# Patient Record
Sex: MALE | Race: WHITE | NOT HISPANIC OR LATINO | Employment: OTHER | URBAN - METROPOLITAN AREA
[De-identification: names, ages, dates, MRNs, and addresses within clinical notes are randomized per-mention and may not be internally consistent; named-entity substitution may affect disease eponyms.]

---

## 2023-10-06 NOTE — PROGRESS NOTES
PT Evaluation     Today's date: 10/9/2023  Patient name: Tamela Parker  : 1940  MRN: [id number]  Referring provider: Monroe Nelson MD  Dx:   Encounter Diagnosis     ICD-10-CM    1. Primary osteoarthritis of right knee  M17.11       2. Status post total right knee replacement  Z96.651           Start Time: 1015  Stop Time: 1100  Total time in clinic (min): 45 minutes    Assessment  Assessment details: Pt is a 82 y.o. male presenting to physical therapy with s/p R TKA on chief complaint of . Pt presents with decreased ROM of R knee and decreased strength of RLE that is resulting in decreased functional mobility. Pt currently limited ambulation, stairs, recreation, and household activities. PT POC will focus on improving ROM and improving quadriceps strength in order to improve function. Pt is a good candidate for skilled PT to address impairments and facilitate return to prior level of function. Impairments: abnormal gait, abnormal or restricted ROM, activity intolerance, impaired balance, impaired physical strength, lacks appropriate home exercise program and pain with function  Functional limitations: ambulation, stairs, recreation, and household activities  Symptom irritability: low  Goals  STG 4 - weeks  1. Patient will demonstrate improved R knee strength to 4+/5 in all planes to facilitate functional ability. 2. Patient will demonstrate improved R knee flexion AROM to 110 degrees to facilitate functional ability. LTG 8 - weeks  1. Patient will demonstrate improved R knee extensor lag to 0 degrees to facilitate functional ability. 2. Patient will demonstrate increased FOTO score by 10 points or more from baseline.     Plan  Patient would benefit from: PT eval and skilled physical therapy  Planned modality interventions: cryotherapy, thermotherapy: hydrocollator packs and unattended electrical stimulation  Planned therapy interventions: ADL retraining, flexibility, functional ROM exercises, home exercise program, joint mobilization, manual therapy, massage, patient education, strengthening, stretching, therapeutic activities, therapeutic exercise, neuromuscular re-education, gait training and balance/weight bearing training  Frequency: 2x week  Duration in weeks: 8  Plan of Care beginning date: 10/9/2023  Plan of Care expiration date: 2023  Treatment plan discussed with: patient        Subjective Evaluation    History of Present Illness  Mechanism of injury: Pt presents s/p R knee replacement on . Order to ambulate with assistive device for 3 weeks after surgery. Pt used walker for about the first week, now carries cane with him for support. Pt reports he had home therapy for 5 sessions for 2 weeks and was pushed adequately. Pt reports he has been improving every day. Pt feels he has plateaued slightly with the stuff at home and is ready for outpatient PT. Pt still has some swelling in R knee and lower R leg, did get swelling in past with BP medication and used compression stockings. Pt reports his pain is mostly a stretching pain now instead of a bony pain. Pt able to do stairs at home and is starting to go up reciprocally with assistance from UE. Pt descending stairs step-to pattern. Pt reports his L knee is bothering him more now. Pt reports mostly just stiffness, especially in the morning, does have slight discomfort with extending the R knee. Pt takes tylenol as needed, usually each morning.   Patient Goals  Patient goals for therapy: decreased pain, decreased edema, increased motion and increased strength  Patient goal: Walking freely and be less conscious of knee; improve stairs  Pain  Current pain ratin  At best pain ratin  At worst pain rating: 3  Location: R knee  Quality: dull ache and discomfort  Relieving factors: medications  Aggravating factors: stair climbing and walking  Progression: improved          Objective     Active Range of Motion   Left Knee Flexion: 119 degrees   Extension: -2 degrees     Right Knee   Flexion: 104 degrees   Extension: -2 degrees   Extensor la degrees     Passive Range of Motion   Left Knee   Flexion: 119 degrees     Right Knee   Flexion: 109 degrees     Strength/Myotome Testing     Left Hip   Planes of Motion   Flexion: 5  Extension: 5  Abduction: 5    Right Hip   Planes of Motion   Flexion: 4  Extension: 4  Abduction: 4    Left Knee   Flexion: 5  Extension: 5    Right Knee   Flexion: 4  Extension: 4    Additional Strength Details  Slight discomfort with extension    Ambulation     Ambulation: Stairs   Ascend stairs: independent  Pattern: reciprocal  Descend stairs: independent  Pattern: reciprocal (With pain/antalgic pattern)    Observational Gait   Gait: antalgic   Decreased walking speed and stride length.               Precautions: R TKA     POC expires MC 30 Days PT/OT + Visit Limit?    BOMN                    Manuals 10/9                                                                Neuro Re-Ed             Pt education about POC and HEP 10'            SLR 1x10            Quad sets 1x5 5"                                                                Ther Ex             Bike for ROM                                                                                                        Ther Activity             Fwd step downs 2x10 2"            Stairs negotiation 2x            Gait Training                                       Modalities

## 2023-10-09 ENCOUNTER — EVALUATION (OUTPATIENT)
Dept: PHYSICAL THERAPY | Facility: CLINIC | Age: 83
End: 2023-10-09
Payer: MEDICARE

## 2023-10-09 DIAGNOSIS — Z96.651 STATUS POST TOTAL RIGHT KNEE REPLACEMENT: ICD-10-CM

## 2023-10-09 DIAGNOSIS — M17.11 PRIMARY OSTEOARTHRITIS OF RIGHT KNEE: Primary | ICD-10-CM

## 2023-10-09 PROCEDURE — 97112 NEUROMUSCULAR REEDUCATION: CPT

## 2023-10-09 PROCEDURE — 97530 THERAPEUTIC ACTIVITIES: CPT

## 2023-10-09 PROCEDURE — 97161 PT EVAL LOW COMPLEX 20 MIN: CPT

## 2023-10-09 NOTE — LETTER
2023    Mariaelena Michael MD   [address] [address] [address]    Patient: Tasia Erickson   YOB: 1940   Date of Visit: 10/9/2023     Encounter Diagnosis     ICD-10-CM    1. Primary osteoarthritis of right knee  M17.11       2. Status post total right knee replacement  Z96.651           Dear Dr. Gayatri Giles:    Thank you for your recent referral of Tasia Erickson. Please review the attached evaluation summary from Rian's recent visit. Please verify that you agree with the plan of care by signing the attached order. If you have any questions or concerns, please do not hesitate to call. I sincerely appreciate the opportunity to share in the care of one of your patients and hope to have another opportunity to work with you in the near future. Sincerely,    Joel Stiles, PT      Referring Provider:      I certify that I have read the below Plan of Care and certify the need for these services furnished under this plan of treatment while under my care. Mariaelena Michael MD   [address] [address] [address]  Via Fax: [phone]          PT Evaluation     Today's date: 10/9/2023  Patient name: Tasia Erickson  : 1940  MRN: [id number]  Referring provider: Mariaelena Michael MD  Dx:   Encounter Diagnosis     ICD-10-CM    1. Primary osteoarthritis of right knee  M17.11       2. Status post total right knee replacement  Z96.651           Start Time: 1015  Stop Time: 1100  Total time in clinic (min): 45 minutes    Assessment  Assessment details: Pt is a 82 y.o. male presenting to physical therapy with s/p R TKA on chief complaint of . Pt presents with decreased ROM of R knee and decreased strength of RLE that is resulting in decreased functional mobility. Pt currently limited ambulation, stairs, recreation, and household activities. PT POC will focus on improving ROM and improving quadriceps strength in order to improve function.  Pt is a good candidate for skilled PT to address impairments and facilitate return to prior level of function. Impairments: abnormal gait, abnormal or restricted ROM, activity intolerance, impaired balance, impaired physical strength, lacks appropriate home exercise program and pain with function  Functional limitations: ambulation, stairs, recreation, and household activities  Symptom irritability: low  Goals  STG 4 - weeks  1. Patient will demonstrate improved R knee strength to 4+/5 in all planes to facilitate functional ability. 2. Patient will demonstrate improved R knee flexion AROM to 110 degrees to facilitate functional ability. LTG 8 - weeks  1. Patient will demonstrate improved R knee extensor lag to 0 degrees to facilitate functional ability. 2. Patient will demonstrate increased FOTO score by 10 points or more from baseline. Plan  Patient would benefit from: PT eval and skilled physical therapy  Planned modality interventions: cryotherapy, thermotherapy: hydrocollator packs and unattended electrical stimulation  Planned therapy interventions: ADL retraining, flexibility, functional ROM exercises, home exercise program, joint mobilization, manual therapy, massage, patient education, strengthening, stretching, therapeutic activities, therapeutic exercise, neuromuscular re-education, gait training and balance/weight bearing training  Frequency: 2x week  Duration in weeks: 8  Plan of Care beginning date: 10/9/2023  Plan of Care expiration date: 12/4/2023  Treatment plan discussed with: patient        Subjective Evaluation    History of Present Illness  Mechanism of injury: Pt presents s/p R knee replacement on September 9th. Order to ambulate with assistive device for 3 weeks after surgery. Pt used walker for about the first week, now carries cane with him for support. Pt reports he had home therapy for 5 sessions for 2 weeks and was pushed adequately. Pt reports he has been improving every day.  Pt feels he has plateaued slightly with the stuff at home and is ready for outpatient PT. Pt still has some swelling in R knee and lower R leg, did get swelling in past with BP medication and used compression stockings. Pt reports his pain is mostly a stretching pain now instead of a bony pain. Pt able to do stairs at home and is starting to go up reciprocally with assistance from UE. Pt descending stairs step-to pattern. Pt reports his L knee is bothering him more now. Pt reports mostly just stiffness, especially in the morning, does have slight discomfort with extending the R knee. Pt takes tylenol as needed, usually each morning. Patient Goals  Patient goals for therapy: decreased pain, decreased edema, increased motion and increased strength  Patient goal: Walking freely and be less conscious of knee; improve stairs  Pain  Current pain ratin  At best pain ratin  At worst pain rating: 3  Location: R knee  Quality: dull ache and discomfort  Relieving factors: medications  Aggravating factors: stair climbing and walking  Progression: improved          Objective     Active Range of Motion   Left Knee   Flexion: 119 degrees   Extension: -2 degrees     Right Knee   Flexion: 104 degrees   Extension: -2 degrees   Extensor la degrees     Passive Range of Motion   Left Knee   Flexion: 119 degrees     Right Knee   Flexion: 109 degrees     Strength/Myotome Testing     Left Hip   Planes of Motion   Flexion: 5  Extension: 5  Abduction: 5    Right Hip   Planes of Motion   Flexion: 4  Extension: 4  Abduction: 4    Left Knee   Flexion: 5  Extension: 5    Right Knee   Flexion: 4  Extension: 4    Additional Strength Details  Slight discomfort with extension    Ambulation     Ambulation: Stairs   Ascend stairs: independent  Pattern: reciprocal  Descend stairs: independent  Pattern: reciprocal (With pain/antalgic pattern)    Observational Gait   Gait: antalgic   Decreased walking speed and stride length.              Precautions: R TKA 9/9    POC expires MC 30 Days PT/OT + Visit Limit?   12/4 11/9 BOMN                    Manuals 10/9                                                                Neuro Re-Ed             Pt education about POC and HEP 10'            SLR 1x10            Quad sets 1x5 5"                                                                Ther Ex             Bike for ROM                                                                                                        Ther Activity             Fwd step downs 2x10 2"            Stairs negotiation 2x            Gait Training                                       Modalities

## 2023-10-12 NOTE — PROGRESS NOTES
Daily Note     Today's date: 10/13/2023  Patient name: Connor Chang  : 1940  MRN: [id number]  Referring provider: Marolyn Spurling, MD  Dx:   Encounter Diagnosis     ICD-10-CM    1. Primary osteoarthritis of right knee  M17.11       2. Status post total right knee replacement  Z96.651           Start Time: 08  Stop Time: 930  Total time in clinic (min): 46 minutes    Subjective: Pt reports he may have overdone it slightly and felt some discomfort in the back of his R leg. Pt also used compression stocking which worked for his edema but pushed the edema proximally and it pooled there. Objective: See treatment diary below      Assessment: Tolerated treatment well. Patient demonstrated fatigue post treatment, exhibited good technique with therapeutic exercises, and would benefit from continued PT. Advised on sciatic nerve glides and performed hamstring stretching for pt's posterior RLE sx and pt responded well to this, improved motion after performance. Pt demonstrates significant fatigue at end of session, advised on cryotherapy to minimize soreness. Plan: Continue per plan of care. Progress treatment as tolerated.        Precautions: R TKA     POC expires MC 30 Days PT/OT + Visit Limit?    BOMN                CHANGE POC to 3x/week at re-evaluation    Manuals 10/9 10/13 FOTO          PROM R knee flex  4'           Patella mobs R knee  NV           Hamstring stretching  5x20"                        Neuro Re-Ed             Pt education  10' 5'           SLR 1x10 2x10           Quad sets 1x5 5" 2x10 5"           Bridges  2x10                                                  Ther Ex             Upright bike for ROM  6'           SL leg press  X15 35#           Minisquats  2x10           Knee flex stretch on step  10x10"           Sciatic nerve glides  2x10                                                  Ther Activity             Fwd step downs 2x10 2" 3x10 2"           Lat step downs Stairs negotiation 2x            Gait Training                                       Modalities

## 2023-10-13 ENCOUNTER — OFFICE VISIT (OUTPATIENT)
Dept: PHYSICAL THERAPY | Facility: CLINIC | Age: 83
End: 2023-10-13
Payer: MEDICARE

## 2023-10-13 DIAGNOSIS — M17.11 PRIMARY OSTEOARTHRITIS OF RIGHT KNEE: Primary | ICD-10-CM

## 2023-10-13 DIAGNOSIS — Z96.651 STATUS POST TOTAL RIGHT KNEE REPLACEMENT: ICD-10-CM

## 2023-10-13 PROCEDURE — 97110 THERAPEUTIC EXERCISES: CPT

## 2023-10-13 PROCEDURE — 97112 NEUROMUSCULAR REEDUCATION: CPT

## 2023-10-16 ENCOUNTER — OFFICE VISIT (OUTPATIENT)
Dept: PHYSICAL THERAPY | Facility: CLINIC | Age: 83
End: 2023-10-16
Payer: MEDICARE

## 2023-10-16 DIAGNOSIS — M17.11 PRIMARY OSTEOARTHRITIS OF RIGHT KNEE: Primary | ICD-10-CM

## 2023-10-16 DIAGNOSIS — Z96.651 STATUS POST TOTAL RIGHT KNEE REPLACEMENT: ICD-10-CM

## 2023-10-16 PROCEDURE — 97140 MANUAL THERAPY 1/> REGIONS: CPT

## 2023-10-16 PROCEDURE — 97112 NEUROMUSCULAR REEDUCATION: CPT

## 2023-10-16 PROCEDURE — 97110 THERAPEUTIC EXERCISES: CPT

## 2023-10-16 NOTE — PROGRESS NOTES
Daily Note     Today's date: 10/16/2023  Patient name: Tamela Parker  : 1940  MRN: [id number]  Referring provider: Monroe Nelson MD  Dx:   Encounter Diagnosis     ICD-10-CM    1. Primary osteoarthritis of right knee  M17.11       2. Status post total right knee replacement  Z96.651           Start Time: 0800  Stop Time: 0845  Total time in clinic (min): 45 minutes    Subjective: Pt reports he feels stiff this morning. Pt feels he has been aggressive with home exercises and has been feeling some soreness along the outside of his thigh. Objective: See treatment diary below      Assessment: Tolerated treatment well. Patient demonstrated fatigue post treatment, exhibited good technique with therapeutic exercises, and would benefit from continued PT. Pt able to progress stepping and initiate side step downs from higher box, as well as increase resistance for leg press. Good tolerance to exercises this session. Plan: Continue per plan of care. Progress treatment as tolerated.        Precautions: R TKA     POC expires MC 30 Days PT/OT + Visit Limit?    BOMN                CHANGE POC to 3x/week at re-evaluation    Manuals 10/9 10/13 10/16 FOTO IE          PROM R knee flex  4' 4'          R knee extension stretch   4x15"          Patella mobs R knee  NV 1'          Hamstring stretching  5x20" 6x20"                       Neuro Re-Ed             Pt education  10' 5'           SLR 1x10 2x10 2x12          Quad sets 1x5 5" 2x10 5" 2x10 5"          Bridges  2x10 2x10                                                 Ther Ex             Upright bike for ROM  6' 9'          SL leg press  X15 35# X20 45#          Minisquats  2x10 2x10          Knee flex stretch on step  10x10" 10x10"          Sciatic nerve glides  2x10                                                  Ther Activity             Fwd step downs 2x10 2" 3x10 2" 1x10 2" 4"         Lat step downs   1x10 4"          Stairs negotiation 2x Gait Training                                       Modalities

## 2023-10-17 ENCOUNTER — OFFICE VISIT (OUTPATIENT)
Dept: PHYSICAL THERAPY | Facility: CLINIC | Age: 83
End: 2023-10-17
Payer: MEDICARE

## 2023-10-17 DIAGNOSIS — Z96.651 STATUS POST TOTAL RIGHT KNEE REPLACEMENT: ICD-10-CM

## 2023-10-17 DIAGNOSIS — M17.11 PRIMARY OSTEOARTHRITIS OF RIGHT KNEE: Primary | ICD-10-CM

## 2023-10-17 PROCEDURE — 97112 NEUROMUSCULAR REEDUCATION: CPT

## 2023-10-17 PROCEDURE — 97110 THERAPEUTIC EXERCISES: CPT

## 2023-10-17 NOTE — PROGRESS NOTES
Daily Note     Today's date: 10/17/2023  Patient name: Ricardo De Souza  : 1940  MRN: [id number]  Referring provider: Perry Engel MD  Dx:   Encounter Diagnosis     ICD-10-CM    1. Primary osteoarthritis of right knee  M17.11       2. Status post total right knee replacement  Z96.651           Start Time: 0800  Stop Time: 0846  Total time in clinic (min): 46 minutes    Subjective: Pt reports feeling his usual morning stiffness today. Objective: See treatment diary below      Assessment: Tolerated treatment well. Patient demonstrated fatigue post treatment, exhibited good technique with therapeutic exercises, and would benefit from continued PT. Pt able to progress with addition of sit to stands but challenged by this. Discussed incorporating this to a higher surface at home, as well as incorporating additional hamstring stretching due to pt noting his chief limitation of tightness here. Plan: Continue per plan of care. Progress treatment as tolerated.        Precautions: R TKA     POC expires MC 30 Days PT/OT + Visit Limit?    BOMN                CHANGE POC to 3x/week at re-evaluation    Manuals 10/9 10/13 10/16 FOTO IE 10/17         PROM R knee flex  4' 4' 4'         R knee extension stretch   4x15" NV         Patella mobs R knee  NV 1' 1'         Hamstring stretching  5x20" 6x20" 6x20"                      Neuro Re-Ed             Pt education  10' 5'  4'         SLR 1x10 2x10 2x12 2x10         Quad sets 1x5 5" 2x10 5" 2x10 5" 2x10 5"         Bridges  2x10 2x10 NV                                                Ther Ex             Upright bike for ROM  6' 9' 8'         SL leg press  X15 35# X20 45# 3x10  45#         Minisquats  2x10 2x10          Knee flex stretch on step  10x10" 10x10"          Sciatic nerve glides  2x10           Prone quad stretch    5x20"                                   Ther Activity             Fwd step downs 2x10 2" 3x10 2" 1x10 2" 2x10 4"         Lat step downs   1x10 4" 2x10 4"         Sit to stands c airex    1x3         Stairs negotiation 2x            Gait Training                                       Modalities

## 2023-10-23 ENCOUNTER — OFFICE VISIT (OUTPATIENT)
Dept: PHYSICAL THERAPY | Facility: CLINIC | Age: 83
End: 2023-10-23
Payer: MEDICARE

## 2023-10-23 DIAGNOSIS — M17.11 PRIMARY OSTEOARTHRITIS OF RIGHT KNEE: Primary | ICD-10-CM

## 2023-10-23 DIAGNOSIS — Z96.651 STATUS POST TOTAL RIGHT KNEE REPLACEMENT: ICD-10-CM

## 2023-10-23 PROCEDURE — 97140 MANUAL THERAPY 1/> REGIONS: CPT

## 2023-10-23 PROCEDURE — 97112 NEUROMUSCULAR REEDUCATION: CPT

## 2023-10-23 PROCEDURE — 97110 THERAPEUTIC EXERCISES: CPT

## 2023-10-23 NOTE — PROGRESS NOTES
Daily Note     Today's date: 10/23/2023  Patient name: Bartolo Downing  : 1940  MRN: [id number]  Referring provider: Erica Melo MD  Dx:   Encounter Diagnosis     ICD-10-CM    1. Primary osteoarthritis of right knee  M17.11       2. Status post total right knee replacement  Z96.651           Start Time: 0800  Stop Time: 0603  Total time in clinic (min): 44 minutes    Subjective: Pt reports he saw his surgeon and she told him he is doing well at this time post-op. Objective: See treatment diary below      Assessment: Tolerated treatment well. Patient demonstrated fatigue post treatment, exhibited good technique with therapeutic exercises, and would benefit from continued PT. Progressed with RDLs to target pt's weakened hamstring muscles and facilitate good body mechanics. Pt also improved with sit to stands and able to perform more repetitions, does feels some pressure in knee still. Plan: Continue per plan of care. Progress treatment as tolerated.        Precautions: R TKA     POC expires MC 30 Days PT/OT + Visit Limit?    BOMN                CHANGE POC to 3x/week at re-evaluation    Manuals 10/9 10/13 10/16 FOTO IE 10/17 10/23        PROM R knee flex  4' 4' 4' 4'        R knee extension stretch   4x15" NV         Patella mobs R knee  NV 1' 1' 1'        Hamstring stretching  5x20" 6x20" 6x20" 5x20"                     Neuro Re-Ed             Pt education  10' 5'  4' 2'        SLR 1x10 2x10 2x12 2x10 3x10        Quad sets 1x5 5" 2x10 5" 2x10 5" 2x10 5"         Bridges  2x10 2x10 NV 2x10        RDLs     2x10 FR cue                                  Ther Ex             Upright bike for ROM  6' 9' 8' 8' lvl 3        SL leg press  X15 35# X20 45# 3x10  45# 1x10  45#; 2x10 55#        Minisquats  2x10 2x10          Knee flex stretch on step  10x10" 10x10"          Sciatic nerve glides  2x10           Prone quad stretch    5x20" NV                                  Ther Activity Fwd step downs 2x10 2" 3x10 2" 1x10 2" 2x10 4" 2x10 4"        Lat step downs   1x10 4" 2x10 4" 2x10 4"; 1x10 6"        Sit to stands c airex    1x3 1x6        Stairs negotiation 2x            Gait Training                                       Modalities

## 2023-10-24 NOTE — PROGRESS NOTES
PT Re-Evaluation     Today's date: 10/25/2023  Patient name: Dee Dee Stevens  : 1940  MRN: [id number]  Referring provider: Jazmyn Cai MD  Dx:   Encounter Diagnosis     ICD-10-CM    1. Primary osteoarthritis of right knee  M17.11       2. Status post total right knee replacement  Z96.651           Start Time: 0800  Stop Time: 0845  Total time in clinic (min): 45 minutes    Assessment  Assessment details: Pt is a 82 y.o. male presenting to physical therapy with s/p R TKA on chief complaint of 2023. Upon re-evaluation, pt demonstrates improved ROM and strength of R knee. Pt demonstrates a new onset of pain and tenderness near distal IT band and appears to demonstrate sx of IT band syndrome and inflammation. Updated POC to address new onset of discomfort and updated frequency to 3x/wk as prescribed by surgeon. Will continue to work towards addressing impairments and improving current functional limitations of ambulation, stairs, recreation, and household activities. Pt is a good candidate for continued skilled PT to address impairments and facilitate return to prior level of function. Impairments: abnormal gait, abnormal or restricted ROM, activity intolerance, impaired balance, impaired physical strength and pain with function  Functional limitations: ambulation, stairs, recreation, and household activities  Symptom irritability: low  Goals  STG 4 - weeks  1. Patient will demonstrate improved R knee strength to 4+/5 in all planes to facilitate functional ability. -ongiong  2. Patient will demonstrate improved R knee flexion AROM to 110 degrees to facilitate functional ability. -met  LTG 8 - weeks  1. Patient will demonstrate improved R knee extensor lag to 0 degrees to facilitate functional ability. -ongoing  2.  Patient will demonstrate increased FOTO score by 10 points or more from baseline. -ongoing    Plan  Patient would benefit from: PT eval and skilled physical therapy  Planned modality interventions: cryotherapy, thermotherapy: hydrocollator packs and unattended electrical stimulation  Planned therapy interventions: ADL retraining, flexibility, functional ROM exercises, home exercise program, joint mobilization, manual therapy, massage, patient education, strengthening, stretching, therapeutic activities, therapeutic exercise, neuromuscular re-education, gait training and balance/weight bearing training  Frequency: 3x week  Duration in weeks: 8  Plan of Care beginning date: 10/25/2023  Plan of Care expiration date: 2023  Treatment plan discussed with: patient        Subjective Evaluation    History of Present Illness  Mechanism of injury: Pt presents s/p R knee replacement on . Order to ambulate with assistive device for 3 weeks after surgery. Pt used walker for about the first week, now carries cane with him for support. Pt reports he had home therapy for 5 sessions for 2 weeks and was pushed adequately. Pt still has some swelling in R knee and lower R leg, did get swelling in past with BP medication and used compression stockings. Upon re-evaluation, pt reports last night he was woken up by an intense cramp in the back and side of his R leg. Pt reports he had only done gentle stretching since Monday's session. Pt stretched and walked this morning and was able to bring the tightness down a bit but still feeling it this morning. Prior to this pt was feeling good and was feeling noticeable improvement with his R leg.   Patient Goals  Patient goals for therapy: decreased pain, decreased edema, increased motion and increased strength  Patient goal: Walking freely and be less conscious of knee; improve stairs  Pain  Current pain ratin  At best pain ratin  At worst pain rating: 10  Location: R knee  Quality: dull ache, discomfort and sharp  Relieving factors: medications  Aggravating factors: stair climbing and walking  Progression: improved          Objective     Tenderness Right Knee   Tenderness in the ITB. Active Range of Motion   Left Knee   Flexion: 119 degrees   Extension: -3 degrees     Right Knee   Flexion: 110 degrees   Extension: -1 degrees   Extensor la degrees     Passive Range of Motion   Left Knee   Flexion: 119 degrees     Strength/Myotome Testing     Left Hip   Planes of Motion   Flexion: 5  Extension: 5  Abduction: 5    Right Hip   Planes of Motion   Flexion: 3+  Extension: 4  Abduction: 4    Left Knee   Flexion: 5  Extension: 5    Right Knee   Flexion: 4-  Extension: 4+    Tests     Additional Tests Details  Pain with active knee flexion combined with hip flexion; less with neutral hip  No pain with passive knee flexion    Ambulation     Ambulation: Stairs   Ascend stairs: independent  Pattern: reciprocal  Descend stairs: independent  Pattern: reciprocal (With pain/antalgic pattern)    Observational Gait   Gait: antalgic   Decreased walking speed and stride length.               Precautions: R TKA     POC expires [facility] 30 Days PT/OT + Visit Limit?    [address] 10/9 10/13 10/16 FOTO IE 10/17 10/23 10/25       PROM R knee flex  4' 4' 4' 4' 3'       R knee extension stretch   4x15" NV         Patella mobs R knee  NV 1' 1' 1'        Hamstring stretching  5x20" 6x20" 6x20" 5x20" 2x20"       IASTM R ITB      4' c roller       Neuro Re-Ed             Pt education  10' 5'  4' 2' 8'       SLR 1x10 2x10 2x12 2x10 3x10 3x10       Quad sets 1x5 5" 2x10 5" 2x10 5" 2x10 5"         Bridges  2x10 2x10 NV 2x10 2x10       RDLs     2x10 FR cue No pain                                 Ther Ex             Upright bike for ROM  6' 9' 8' 8' lvl 3 12'       SL leg press  X15 35# X20 45# 3x10  45# 1x10  45#; 2x10 55#        Minisquats  2x10 2x10          Knee flex stretch on step  10x10" 10x10"          Sciatic nerve glides  2x10           Prone quad stretch    5x20" NV 2x20"       S/L hip abd      2x8                    Ther Activity             Fwd step downs 2x10 2" 3x10 2" 1x10 2" 2x10 4" 2x10 4"        Lat step downs   1x10 4" 2x10 4" 2x10 4"; 1x10 6"        Sit to stands c airex    1x3 1x6        Stairs negotiation 2x            Gait Training                                       Modalities

## 2023-10-25 ENCOUNTER — EVALUATION (OUTPATIENT)
Dept: PHYSICAL THERAPY | Facility: CLINIC | Age: 83
End: 2023-10-25
Payer: MEDICARE

## 2023-10-25 DIAGNOSIS — Z96.651 STATUS POST TOTAL RIGHT KNEE REPLACEMENT: ICD-10-CM

## 2023-10-25 DIAGNOSIS — M17.11 PRIMARY OSTEOARTHRITIS OF RIGHT KNEE: Primary | ICD-10-CM

## 2023-10-25 PROCEDURE — 97112 NEUROMUSCULAR REEDUCATION: CPT

## 2023-10-25 PROCEDURE — 97110 THERAPEUTIC EXERCISES: CPT

## 2023-10-25 PROCEDURE — 97140 MANUAL THERAPY 1/> REGIONS: CPT

## 2023-10-25 NOTE — LETTER
2023    Consuella Halsted, MD   [facility] [address]    Patient: Daniela Tineo   YOB: 1940   Date of Visit: 10/25/2023     Encounter Diagnosis     ICD-10-CM    1. Primary osteoarthritis of right knee  M17.11       2. Status post total right knee replacement  Z96.651           Dear Dr. Inga Merlin:    Thank you for your recent referral of Daniela Tineo. Please review the attached evaluation summary from Rian's recent visit. Please verify that you agree with the plan of care by signing the attached order. If you have any questions or concerns, please do not hesitate to call. I sincerely appreciate the opportunity to share in the care of one of your patients and hope to have another opportunity to work with you in the near future. Sincerely,    Gisel Martinez, PT      Referring Provider:      I certify that I have read the below Plan of Care and certify the need for these services furnished under this plan of treatment while under my care. Consuella Halsted, MD   [facility] [address]  Via Fax: [phone]          PT Re-Evaluation     Today's date: 10/25/2023  Patient name: Daniela Tineo  : 1940  MRN: [id number]  Referring provider: Consuella Halsted, MD  Dx:   Encounter Diagnosis     ICD-10-CM    1. Primary osteoarthritis of right knee  M17.11       2. Status post total right knee replacement  Z96.651           Start Time: 0800  Stop Time: 0845  Total time in clinic (min): 45 minutes    Assessment  Assessment details: Pt is a 82 y.o. male presenting to physical therapy with s/p R TKA on chief complaint of 2023. Upon re-evaluation, pt demonstrates improved ROM and strength of R knee. Pt demonstrates a new onset of pain and tenderness near distal IT band and appears to demonstrate sx of IT band syndrome and inflammation.  Updated POC to address new onset of discomfort and updated frequency to 3x/wk as prescribed by surgeon. Will continue to work towards addressing impairments and improving current functional limitations of ambulation, stairs, recreation, and household activities. Pt is a good candidate for continued skilled PT to address impairments and facilitate return to prior level of function. Impairments: abnormal gait, abnormal or restricted ROM, activity intolerance, impaired balance, impaired physical strength and pain with function  Functional limitations: ambulation, stairs, recreation, and household activities  Symptom irritability: low  Goals  STG 4 - weeks  1. Patient will demonstrate improved R knee strength to 4+/5 in all planes to facilitate functional ability. -ongiong  2. Patient will demonstrate improved R knee flexion AROM to 110 degrees to facilitate functional ability. -met  LTG 8 - weeks  1. Patient will demonstrate improved R knee extensor lag to 0 degrees to facilitate functional ability. -ongoing  2. Patient will demonstrate increased FOTO score by 10 points or more from baseline. -ongoing    Plan  Patient would benefit from: PT eval and skilled physical therapy  Planned modality interventions: cryotherapy, thermotherapy: hydrocollator packs and unattended electrical stimulation  Planned therapy interventions: ADL retraining, flexibility, functional ROM exercises, home exercise program, joint mobilization, manual therapy, massage, patient education, strengthening, stretching, therapeutic activities, therapeutic exercise, neuromuscular re-education, gait training and balance/weight bearing training  Frequency: 3x week  Duration in weeks: 8  Plan of Care beginning date: 10/25/2023  Plan of Care expiration date: 12/20/2023  Treatment plan discussed with: patient        Subjective Evaluation    History of Present Illness  Mechanism of injury: Pt presents s/p R knee replacement on September 9th. Order to ambulate with assistive device for 3 weeks after surgery.  Pt used walker for about the first week, now carries cane with him for support. Pt reports he had home therapy for 5 sessions for 2 weeks and was pushed adequately. Pt still has some swelling in R knee and lower R leg, did get swelling in past with BP medication and used compression stockings. Upon re-evaluation, pt reports last night he was woken up by an intense cramp in the back and side of his R leg. Pt reports he had only done gentle stretching since Monday's session. Pt stretched and walked this morning and was able to bring the tightness down a bit but still feeling it this morning. Prior to this pt was feeling good and was feeling noticeable improvement with his R leg. Patient Goals  Patient goals for therapy: decreased pain, decreased edema, increased motion and increased strength  Patient goal: Walking freely and be less conscious of knee; improve stairs  Pain  Current pain ratin  At best pain ratin  At worst pain rating: 10  Location: R knee  Quality: dull ache, discomfort and sharp  Relieving factors: medications  Aggravating factors: stair climbing and walking  Progression: improved          Objective     Tenderness     Right Knee   Tenderness in the ITB.      Active Range of Motion   Left Knee   Flexion: 119 degrees   Extension: -3 degrees     Right Knee   Flexion: 110 degrees   Extension: -1 degrees   Extensor la degrees     Passive Range of Motion   Left Knee   Flexion: 119 degrees     Strength/Myotome Testing     Left Hip   Planes of Motion   Flexion: 5  Extension: 5  Abduction: 5    Right Hip   Planes of Motion   Flexion: 3+  Extension: 4  Abduction: 4    Left Knee   Flexion: 5  Extension: 5    Right Knee   Flexion: 4-  Extension: 4+    Tests     Additional Tests Details  Pain with active knee flexion combined with hip flexion; less with neutral hip  No pain with passive knee flexion    Ambulation     Ambulation: Stairs   Ascend stairs: independent  Pattern: reciprocal  Descend stairs: independent  Pattern: reciprocal (With pain/antalgic pattern)    Observational Gait   Gait: antalgic   Decreased walking speed and stride length.               Precautions: R TKA 9/9    POC expires MC 30 Days PT/OT + Visit Limit?   12/21 11/25 [address] 10/9 10/13 10/16 FOTO IE 10/17 10/23 10/25       PROM R knee flex  4' 4' 4' 4' 3'       R knee extension stretch   4x15" NV         Patella mobs R knee  NV 1' 1' 1'        Hamstring stretching  5x20" 6x20" 6x20" 5x20" 2x20"       IASTM R ITB      4' c roller       Neuro Re-Ed             Pt education  10' 5'  4' 2' 8'       SLR 1x10 2x10 2x12 2x10 3x10 3x10       Quad sets 1x5 5" 2x10 5" 2x10 5" 2x10 5"         Bridges  2x10 2x10 NV 2x10 2x10       RDLs     2x10 FR cue No pain                                 Ther Ex             Upright bike for ROM  6' 9' 8' 8' lvl 3 12'       SL leg press  X15 35# X20 45# 3x10  45# 1x10  45#; 2x10 55#        Minisquats  2x10 2x10          Knee flex stretch on step  10x10" 10x10"          Sciatic nerve glides  2x10           Prone quad stretch    5x20" NV 2x20"       S/L hip abd      2x8                    Ther Activity             Fwd step downs 2x10 2" 3x10 2" 1x10 2" 2x10 4" 2x10 4"        Lat step downs   1x10 4" 2x10 4" 2x10 4"; 1x10 6"        Sit to stands c airex    1x3 1x6        Stairs negotiation 2x            Gait Training                                       Modalities

## 2023-10-25 NOTE — LETTER
2023      No Recipients    Patient: Bethany Ganser   YOB: 1940   Date of Visit: 10/25/2023     Encounter Diagnosis     ICD-10-CM    1. Primary osteoarthritis of right knee  M17.11       2. Status post total right knee replacement  Z96.651           Dear Dr. Charity Oliva:    Thank you for your recent referral of Bethany Ganser. Please review the attached evaluation summary from Rian's recent visit. Please verify that you agree with the plan of care by signing the attached order. If you have any questions or concerns, please do not hesitate to call. I sincerely appreciate the opportunity to share in the care of one of your patients and hope to have another opportunity to work with you in the near future. Sincerely,    Subhash Child, PT      Referring Provider:      I certify that I have read the below Plan of Care and certify the need for these services furnished under this plan of treatment while under my care. Kyle Hope MD  [address] [place] [address]  Via Fax: [phone]          PT Re-Evaluation     Today's date: 10/25/2023  Patient name: Bethany Ganser  : 1940  MRN: [id number]  Referring provider: Kyle Hope MD  Dx:   Encounter Diagnosis     ICD-10-CM    1. Primary osteoarthritis of right knee  M17.11       2. Status post total right knee replacement  Z96.651           Start Time: 0800  Stop Time: 0845  Total time in clinic (min): 45 minutes    Assessment  Assessment details: Pt is a 82 y.o. male presenting to physical therapy with s/p R TKA on chief complaint of 2023. Upon re-evaluation, pt demonstrates improved ROM and strength of R knee. Pt demonstrates a new onset of pain and tenderness near distal IT band and appears to demonstrate sx of IT band syndrome and inflammation. Updated POC to address new onset of discomfort and updated frequency to 3x/wk as prescribed by surgeon.  Will continue to work towards addressing impairments and improving current functional limitations of ambulation, stairs, recreation, and household activities. Pt is a good candidate for continued skilled PT to address impairments and facilitate return to prior level of function. Impairments: abnormal gait, abnormal or restricted ROM, activity intolerance, impaired balance, impaired physical strength and pain with function  Functional limitations: ambulation, stairs, recreation, and household activities  Symptom irritability: low  Goals  STG 4 - weeks  1. Patient will demonstrate improved R knee strength to 4+/5 in all planes to facilitate functional ability. -ongiong  2. Patient will demonstrate improved R knee flexion AROM to 110 degrees to facilitate functional ability. -met  LTG 8 - weeks  1. Patient will demonstrate improved R knee extensor lag to 0 degrees to facilitate functional ability. -ongoing  2. Patient will demonstrate increased FOTO score by 10 points or more from baseline. -ongoing    Plan  Patient would benefit from: PT eval and skilled physical therapy  Planned modality interventions: cryotherapy, thermotherapy: hydrocollator packs and unattended electrical stimulation  Planned therapy interventions: ADL retraining, flexibility, functional ROM exercises, home exercise program, joint mobilization, manual therapy, massage, patient education, strengthening, stretching, therapeutic activities, therapeutic exercise, neuromuscular re-education, gait training and balance/weight bearing training  Frequency: 3x week  Duration in weeks: 8  Plan of Care beginning date: 10/25/2023  Plan of Care expiration date: 12/20/2023  Treatment plan discussed with: patient        Subjective Evaluation    History of Present Illness  Mechanism of injury: Pt presents s/p R knee replacement on September 9th. Order to ambulate with assistive device for 3 weeks after surgery. Pt used walker for about the first week, now carries cane with him for support.  Pt reports he had home therapy for 5 sessions for 2 weeks and was pushed adequately. Pt still has some swelling in R knee and lower R leg, did get swelling in past with BP medication and used compression stockings. Upon re-evaluation, pt reports last night he was woken up by an intense cramp in the back and side of his R leg. Pt reports he had only done gentle stretching since Monday's session. Pt stretched and walked this morning and was able to bring the tightness down a bit but still feeling it this morning. Prior to this pt was feeling good and was feeling noticeable improvement with his R leg. Patient Goals  Patient goals for therapy: decreased pain, decreased edema, increased motion and increased strength  Patient goal: Walking freely and be less conscious of knee; improve stairs  Pain  Current pain ratin  At best pain ratin  At worst pain rating: 10  Location: R knee  Quality: dull ache, discomfort and sharp  Relieving factors: medications  Aggravating factors: stair climbing and walking  Progression: improved          Objective     Tenderness     Right Knee   Tenderness in the ITB.      Active Range of Motion   Left Knee   Flexion: 119 degrees   Extension: -3 degrees     Right Knee   Flexion: 110 degrees   Extension: -1 degrees   Extensor la degrees     Passive Range of Motion   Left Knee   Flexion: 119 degrees     Strength/Myotome Testing     Left Hip   Planes of Motion   Flexion: 5  Extension: 5  Abduction: 5    Right Hip   Planes of Motion   Flexion: 3+  Extension: 4  Abduction: 4    Left Knee   Flexion: 5  Extension: 5    Right Knee   Flexion: 4-  Extension: 4+    Tests     Additional Tests Details  Pain with active knee flexion combined with hip flexion; less with neutral hip  No pain with passive knee flexion    Ambulation     Ambulation: Stairs   Ascend stairs: independent  Pattern: reciprocal  Descend stairs: independent  Pattern: reciprocal (With pain/antalgic pattern)    Observational Gait Gait: antalgic   Decreased walking speed and stride length.               Precautions: R TKA 9/9    POC expires MC 30 Days PT/OT + Visit Limit?   12/21 11/25 [address] 10/9 10/13 10/16 FOTO IE 10/17 10/23 10/25       PROM R knee flex  4' 4' 4' 4' 3'       R knee extension stretch   4x15" NV         Patella mobs R knee  NV 1' 1' 1'        Hamstring stretching  5x20" 6x20" 6x20" 5x20" 2x20"       IASTM R ITB      4' c roller       Neuro Re-Ed             Pt education  10' 5'  4' 2' 8'       SLR 1x10 2x10 2x12 2x10 3x10 3x10       Quad sets 1x5 5" 2x10 5" 2x10 5" 2x10 5"         Bridges  2x10 2x10 NV 2x10 2x10       RDLs     2x10 FR cue No pain                                 Ther Ex             Upright bike for ROM  6' 9' 8' 8' lvl 3 12'       SL leg press  X15 35# X20 45# 3x10  45# 1x10  45#; 2x10 55#        Minisquats  2x10 2x10          Knee flex stretch on step  10x10" 10x10"          Sciatic nerve glides  2x10           Prone quad stretch    5x20" NV 2x20"       S/L hip abd      2x8                    Ther Activity             Fwd step downs 2x10 2" 3x10 2" 1x10 2" 2x10 4" 2x10 4"        Lat step downs   1x10 4" 2x10 4" 2x10 4"; 1x10 6"        Sit to stands c airex    1x3 1x6        Stairs negotiation 2x            Gait Training                                       Modalities

## 2023-10-26 NOTE — PROGRESS NOTES
Daily Note     Today's date: 10/27/2023  Patient name: Tierney Betancur  : 1940  MRN: [id number]  Referring provider: Micky James MD  Dx:   Encounter Diagnosis     ICD-10-CM    1. Primary osteoarthritis of right knee  M17.11       2. Status post total right knee replacement  Z96.651           Start Time: 0800  Stop Time: 0846  Total time in clinic (min): 46 minutes    Subjective: Pt reports his sx have improved since prior visit secondary to massaging his leg. Objective: See treatment diary below      Assessment: Tolerated treatment well. Patient demonstrated fatigue post treatment, exhibited good technique with therapeutic exercises, and would benefit from continued PT. Pt's sx improved from last visit, still some tenderness along R IT band. Pt quick to fatigue with hip stability exercises and will continue to progress these as tolerated in order to improve IT band sx. Plan: Continue per plan of care. Progress treatment as tolerated.        Precautions: R TKA     POC expires MC 30 Days PT/OT + Visit Limit?    [address] 10/9 10/13 10/16 FOTO IE 10/17 10/23 10/25 10/27      PROM R knee flex  4' 4' 4' 4' 3'       R knee extension stretch   4x15" NV         Patella mobs R knee  NV 1' 1' 1'        Hamstring stretching  5x20" 6x20" 6x20" 5x20" 2x20"       IASTM R ITB      4' c roller 4' c roller      Neuro Re-Ed             Pt education  10' 5'  4' 2' 8' 5'      SLR 1x10 2x10 2x12 2x10 3x10 3x10 3x10      Quad sets 1x5 5" 2x10 5" 2x10 5" 2x10 5"         Bridges  2x10 2x10 NV 2x10 2x10 2x10      RDLs     2x10 FR cue No pain       Prone hip extension       2x10                   Ther Ex             Upright bike for ROM  6' 9' 8' 8' lvl 3 12' 9' lvl 1      SL leg press  X15 35# X20 45# 3x10  45# 1x10  45#; 2x10 55#  3x10 45#      Minisquats  2x10 2x10          Knee flex stretch on step  10x10" 10x10"          Sciatic nerve glides  2x10           Prone quad stretch 5x20" NV 2x20" 5x20"      S/L hip abd      2x8 2x10                   Ther Activity             Fwd step downs 2x10 2" 3x10 2" 1x10 2" 2x10 4" 2x10 4"  3x10 4"      Lat step downs   1x10 4" 2x10 4" 2x10 4"; 1x10 6"        Sit to stands c airex    1x3 1x6  NV      Stairs negotiation 2x            Gait Training                                       Modalities

## 2023-10-27 ENCOUNTER — OFFICE VISIT (OUTPATIENT)
Dept: PHYSICAL THERAPY | Facility: CLINIC | Age: 83
End: 2023-10-27
Payer: MEDICARE

## 2023-10-27 DIAGNOSIS — M17.11 PRIMARY OSTEOARTHRITIS OF RIGHT KNEE: Primary | ICD-10-CM

## 2023-10-27 DIAGNOSIS — Z96.651 STATUS POST TOTAL RIGHT KNEE REPLACEMENT: ICD-10-CM

## 2023-10-27 PROCEDURE — 97112 NEUROMUSCULAR REEDUCATION: CPT

## 2023-10-27 PROCEDURE — 97110 THERAPEUTIC EXERCISES: CPT

## 2023-10-30 ENCOUNTER — OFFICE VISIT (OUTPATIENT)
Dept: PHYSICAL THERAPY | Facility: CLINIC | Age: 83
End: 2023-10-30
Payer: MEDICARE

## 2023-10-30 DIAGNOSIS — Z96.651 STATUS POST TOTAL RIGHT KNEE REPLACEMENT: ICD-10-CM

## 2023-10-30 DIAGNOSIS — M17.11 PRIMARY OSTEOARTHRITIS OF RIGHT KNEE: Primary | ICD-10-CM

## 2023-10-30 PROCEDURE — 97112 NEUROMUSCULAR REEDUCATION: CPT

## 2023-10-30 PROCEDURE — 97110 THERAPEUTIC EXERCISES: CPT

## 2023-10-30 NOTE — PROGRESS NOTES
Daily Note     Today's date: 10/30/2023  Patient name: Baltazar Verma  : 1940  MRN: [id number]  Referring provider: Ester Arauz MD  Dx:   Encounter Diagnosis     ICD-10-CM    1. Primary osteoarthritis of right knee  M17.11       2. Status post total right knee replacement  Z96.651           Start Time: 0800  Stop Time: 0846  Total time in clinic (min): 46 minutes    Subjective: Pt reports feeling better today, mostly back to feeling his usual morning stiffness. Objective: See treatment diary below      Assessment: Tolerated treatment well. Patient demonstrated fatigue post treatment, exhibited good technique with therapeutic exercises, and would benefit from continued PT. Pt able to progress hip and leg strengthening as tolerated without aggravation of R leg. Pt still demonstrates some tenderness in IT band, continued with IASTM to this as tolerated. Plan: Continue per plan of care. Progress treatment as tolerated.        Precautions: R TKA     POC expires MC 30 Days PT/OT + Visit Limit?    [address] 10/9 10/13 10/16 FOTO IE 10/17 10/23 10/25 10/27 10/30     PROM R knee flex  4' 4' 4' 4' 3'  3'     R knee extension stretch   4x15" NV         Patella mobs R knee  NV 1' 1' 1'   1'     Hamstring stretching  5x20" 6x20" 6x20" 5x20" 2x20"       IASTM R ITB      4' c roller 4' c roller 4' c roller     Neuro Re-Ed             Pt education  10' 5'  4' 2' 8' 5' 3'     SLR 1x10 2x10 2x12 2x10 3x10 3x10 3x10 3x10     Quad sets 1x5 5" 2x10 5" 2x10 5" 2x10 5"         Bridges  2x10 2x10 NV 2x10 2x10 2x10 2x10     RDLs     2x10 FR cue No pain       Prone hip extension       2x10 2x10, 1x5                  Ther Ex             Upright bike for ROM  6' 9' 8' 8' lvl 3 12' 9' lvl 1 9' lvl 1     SL leg press  X15 35# X20 45# 3x10  45# 1x10  45#; 2x10 55#  3x10 45# 3x10 55#     Minisquats  2x10 2x10          Knee flex stretch on step  10x10" 10x10"     10x10"     Sciatic nerve glides  2x10           Prone quad stretch    5x20" NV 2x20" 5x20" 5x20"     S/L hip abd      2x8 2x10 2x10     Clamshell       2x10 grn 2x10 grn     Ther Activity             Fwd step downs 2x10 2" 3x10 2" 1x10 2" 2x10 4" 2x10 4"  3x10 4" 3x10 4"     Lat step downs   1x10 4" 2x10 4" 2x10 4"; 1x10 6"        Sit to stands c airex    1x3 1x6  NV NV     Stairs negotiation 2x            Gait Training                                       Modalities

## 2023-11-01 ENCOUNTER — OFFICE VISIT (OUTPATIENT)
Dept: PHYSICAL THERAPY | Facility: CLINIC | Age: 83
End: 2023-11-01
Payer: MEDICARE

## 2023-11-01 DIAGNOSIS — M17.11 PRIMARY OSTEOARTHRITIS OF RIGHT KNEE: Primary | ICD-10-CM

## 2023-11-01 DIAGNOSIS — Z96.651 STATUS POST TOTAL RIGHT KNEE REPLACEMENT: ICD-10-CM

## 2023-11-01 PROCEDURE — 97112 NEUROMUSCULAR REEDUCATION: CPT

## 2023-11-01 PROCEDURE — 97110 THERAPEUTIC EXERCISES: CPT

## 2023-11-01 PROCEDURE — 97530 THERAPEUTIC ACTIVITIES: CPT

## 2023-11-01 NOTE — PROGRESS NOTES
Daily Note     Today's date: 2023  Patient name: Dat Shepard  : 1940  MRN: [id number]  Referring provider: Patricia Valencia MD  Dx:   Encounter Diagnosis     ICD-10-CM    1. Primary osteoarthritis of right knee  M17.11       2. Status post total right knee replacement  Z96.651           Start Time: 0800  Stop Time: 0845  Total time in clinic (min): 45 minutes    Subjective: Pt reports knee is a little stiff this morning but overall less than usual.       Objective: See treatment diary below      Assessment: Tolerated treatment well. Patient demonstrated fatigue post treatment, exhibited good technique with therapeutic exercises, and would benefit from continued PT. Slight extensor lag with straight leg raises but improved with cueing. Pt able to make progressions of repetitions, especially improved with his sit to stands today. Plan: Continue per plan of care. Progress treatment as tolerated.        Precautions: R TKA     POC expires MC 30 Days PT/OT + Visit Limit?    [address] 10/9 10/13 10/16 FOTO IE 10/17 10/23 10/25 10/27 10/30 11/1    PROM R knee flex  4' 4' 4' 4' 3'  3'     R knee extension stretch   4x15" NV         Patella mobs R knee  NV 1' 1' 1'   1'     Hamstring stretching  5x20" 6x20" 6x20" 5x20" 2x20"   5x20"    IASTM R ITB      4' c roller 4' c roller 4' c roller 4' c roller    Neuro Re-Ed             Pt education  10' 5'  4' 2' 8' 5' 3' 3'    SLR 1x10 2x10 2x12 2x10 3x10 3x10 3x10 3x10 3x10    Quad sets 1x5 5" 2x10 5" 2x10 5" 2x10 5"         Bridges  2x10 2x10 NV 2x10 2x10 2x10 2x10 2x12    RDLs     2x10 FR cue No pain       Prone hip extension       2x10 2x10, 1x5 2x12                 Ther Ex             Upright bike for ROM  6' 9' 8' 8' lvl 3 12' 9' lvl 1 9' lvl 1 10'    SL leg press  X15 35# X20 45# 3x10  45# 1x10  45#; 2x10 55#  3x10 45# 3x10 55# NV    Minisquats  2x10 2x10          Knee flex stretch on step  10x10" 10x10"     10x10" Sciatic nerve glides  2x10           Prone quad stretch    5x20" NV 2x20" 5x20" 5x20" 5x20"    S/L hip abd      2x8 2x10 2x10 2x12    Clamshell       2x10 grn 2x10 grn 2x12 grn    Ther Activity             Fwd step downs 2x10 2" 3x10 2" 1x10 2" 2x10 4" 2x10 4"  3x10 4" 3x10 4" 3x10 4"    Lat step downs   1x10 4" 2x10 4" 2x10 4"; 1x10 6"        Sit to stands c airex    1x3 1x6  NV NV 2x6    Stairs negotiation 2x            Gait Training                                       Modalities

## 2023-11-03 ENCOUNTER — OFFICE VISIT (OUTPATIENT)
Dept: PHYSICAL THERAPY | Facility: CLINIC | Age: 83
End: 2023-11-03
Payer: MEDICARE

## 2023-11-03 DIAGNOSIS — Z96.651 STATUS POST TOTAL RIGHT KNEE REPLACEMENT: ICD-10-CM

## 2023-11-03 DIAGNOSIS — M17.11 PRIMARY OSTEOARTHRITIS OF RIGHT KNEE: Primary | ICD-10-CM

## 2023-11-03 PROCEDURE — 97110 THERAPEUTIC EXERCISES: CPT

## 2023-11-03 PROCEDURE — 97112 NEUROMUSCULAR REEDUCATION: CPT

## 2023-11-03 NOTE — PROGRESS NOTES
Daily Note     Today's date: 11/3/2023  Patient name: Cheryl Garcia  : 1940  MRN: [id number]  Referring provider: Deana Escobar MD  Dx:   Encounter Diagnosis     ICD-10-CM    1. Primary osteoarthritis of right knee  M17.11       2. Status post total right knee replacement  Z96.651           Start Time: 0800  Stop Time: 0845  Total time in clinic (min): 45 minutes    Subjective: Patient reports 0/10 pain, but stiff and tightness. Objective: See treatment diary below      Assessment: Tolerated treatment well. Patient participated in skilled PT session focused on strengthening, stretching, and ROM. Patient able to complete exercise program with a relief in tightness and stiffness at end of session. Patient continues to demonstrate R quad weakness with muscle fatigue with exercises. Patient would continue to benefit from skilled PT interventions to address strengthening, stretching, and ROM. Patient demonstrated fatigue post treatment and exhibited good technique with therapeutic exercises      Plan: Continue per plan of care.       Precautions: R TKA   s  POC expires MC 30 Days PT/OT + Visit Limit?    [address] 10/9 10/13 10/16 FOTO IE 10/17 10/23 10/25 10/27 10/30 11/1 11/3   PROM R knee flex  4' 4' 4' 4' 3'  3'     R knee extension stretch   4x15" NV         Patella mobs R knee  NV 1' 1' 1'   1'     Hamstring stretching  5x20" 6x20" 6x20" 5x20" 2x20"   5x20" 20" 5x   IASTM R ITB      4' c roller 4' c roller 4' c roller 4' c roller 4' c roller   Neuro Re-Ed             Pt education  10' 5'  4' 2' 8' 5' 3' 3'    SLR 1x10 2x10 2x12 2x10 3x10 3x10 3x10 3x10 3x10 3x10   Quad sets 1x5 5" 2x10 5" 2x10 5" 2x10 5"         Bridges  2x10 2x10 NV 2x10 2x10 2x10 2x10 2x12 2x12   RDLs     2x10 FR cue No pain       Prone hip extension       2x10 2x10, 1x5 2x12 2x12                Ther Ex             Upright bike for ROM  6' 9' 8' 8' lvl 3 12' 9' lvl 1 9' lvl 1 10' 10'   SL leg press  X15 35# X20 45# 3x10  45# 1x10  45#; 2x10 55#  3x10 45# 3x10 55# NV 55# 2 x10     65# 10x   Minisquats  2x10 2x10          Knee flex stretch on step  10x10" 10x10"     10x10"     Sciatic nerve glides  2x10           Prone quad stretch    5x20" NV 2x20" 5x20" 5x20" 5x20" 20" 5x   S/L hip abd      2x8 2x10 2x10 2x12 2x12   Clamshell       2x10 grn 2x10 grn 2x12 grn GTB 2x12   Ther Activity             Fwd step downs 2x10 2" 3x10 2" 1x10 2" 2x10 4" 2x10 4"  3x10 4" 3x10 4" 3x10 4" 6" step 15x   Lat step downs   1x10 4" 2x10 4" 2x10 4"; 1x10 6"     6" step 2x10   Sit to stands c airex    1x3 1x6  NV NV 2x6 2x10    Stairs negotiation 2x            Gait Training                                       Modalities

## 2023-11-08 ENCOUNTER — OFFICE VISIT (OUTPATIENT)
Dept: PHYSICAL THERAPY | Facility: CLINIC | Age: 83
End: 2023-11-08
Payer: MEDICARE

## 2023-11-08 DIAGNOSIS — M17.11 PRIMARY OSTEOARTHRITIS OF RIGHT KNEE: Primary | ICD-10-CM

## 2023-11-08 DIAGNOSIS — Z96.651 STATUS POST TOTAL RIGHT KNEE REPLACEMENT: ICD-10-CM

## 2023-11-08 PROCEDURE — 97112 NEUROMUSCULAR REEDUCATION: CPT

## 2023-11-08 PROCEDURE — 97140 MANUAL THERAPY 1/> REGIONS: CPT

## 2023-11-08 PROCEDURE — 97110 THERAPEUTIC EXERCISES: CPT

## 2023-11-08 NOTE — PROGRESS NOTES
Daily Note     Today's date: 2023  Patient name: Ricardo De Souza  : 1940  MRN: [id number]  Referring provider: Perry Engel MD  Dx:   Encounter Diagnosis     ICD-10-CM    1. Primary osteoarthritis of right knee  M17.11       2. Status post total right knee replacement  Z96.651           Start Time: 0800  Stop Time: 0845  Total time in clinic (min): 45 minutes    Subjective: Pt reports having just his usual soreness in the morning. Objective: See treatment diary below      Assessment: Tolerated treatment well. Patient demonstrated fatigue post treatment, exhibited good technique with therapeutic exercises, and would benefit from continued PT. Pt able to progress repetitions for some exercises and able to initiate sled push to focus on terminal knee extension with quadriceps activation. Pt tolerated progressions well without discomfort. Plan: Continue per plan of care. Progress treatment as tolerated.        Precautions: R TKA   s  POC expires MC 30 Days PT/OT + Visit Limit?    [facility]                Manuals  FOTO     10/27 10/30 11/1 11/3   PROM R knee flex        3'     R knee extension stretch             Patella mobs R knee        1'     Hamstring stretching 5x20"        5x20" 20" 5x   IASTM R ITB 4' c roller      4' c roller 4' c roller 4' c roller 4' c roller   Neuro Re-Ed             Pt education  3'      5' 3' 3'    SLR 3x10      3x10 3x10 3x10 3x10   Quad sets             Bridges 2x15      2x10 2x10 2x12 2x12   RDLs             Prone hip extension 2x15      2x10 2x10, 1x5 2x12 2x12   Sled push for TKE 2 laps 10#            Ther Ex             Upright bike for ROM 10'      9' lvl 1 9' lvl 1 10' 10'   SL leg press 55#   65# 2x10      3x10 45# 3x10 55# NV 55# 2 x10     65# 10x   Minisquats             Knee flex stretch on step        10x10"     Sciatic nerve glides             Prone quad stretch 20" 5x      5x20" 5x20" 5x20" 20" 5x   S/L hip abd 2x12      2x10 2x10 2x12 2x12   Clamshell GTB 2x12      2x10 grn 2x10 grn 2x12 grn GTB 2x12   Ther Activity             Fwd step downs       3x10 4" 3x10 4" 3x10 4" 6" step 15x   Lat step downs          6" step 2x10   Sit to stands c airex 2x10      NV NV 2x6 2x10    Stairs negotiation             Gait Training                                       Modalities

## 2023-11-10 ENCOUNTER — OFFICE VISIT (OUTPATIENT)
Dept: PHYSICAL THERAPY | Facility: CLINIC | Age: 83
End: 2023-11-10
Payer: MEDICARE

## 2023-11-10 DIAGNOSIS — M17.11 PRIMARY OSTEOARTHRITIS OF RIGHT KNEE: Primary | ICD-10-CM

## 2023-11-10 DIAGNOSIS — Z96.651 STATUS POST TOTAL RIGHT KNEE REPLACEMENT: ICD-10-CM

## 2023-11-10 PROCEDURE — 97110 THERAPEUTIC EXERCISES: CPT

## 2023-11-10 PROCEDURE — 97112 NEUROMUSCULAR REEDUCATION: CPT

## 2023-11-10 NOTE — PROGRESS NOTES
Daily Note     Today's date: 11/10/2023  Patient name: Gaby Razo  : 1940  MRN: [id number]  Referring provider: Perla Bamberger, MD  Dx:   Encounter Diagnosis     ICD-10-CM    1. Primary osteoarthritis of right knee  M17.11       2. Status post total right knee replacement  Z96.651           Start Time: 0800  Stop Time: 0846  Total time in clinic (min): 46 minutes    Subjective: Pt reports he took it easy yesterday between physical therapy sessions. Objective: See treatment diary below      Assessment: Tolerated treatment well. Patient demonstrated fatigue post treatment, exhibited good technique with therapeutic exercises, and would benefit from continued PT. Pt able to progress some repetitions as tolerated and able to increase resistance for sled push while maintaining good technique. Pt's FOTO score improved showing improving functional ability. Plan: Continue per plan of care. Progress treatment as tolerated.        Precautions: R TKA   s  POC expires MC 30 Days PT/OT + Visit Limit?    [facility]                Manuals 11/8 11/10FOTO     10/27 10/30 11/1 11/3   PROM R knee flex        3'     R knee extension stretch             Patella mobs R knee        1'     Hamstring stretching 5x20" 5x20"       5x20" 20" 5x   IASTM R ITB 4' c roller 4' c roller     4' c roller 4' c roller 4' c roller 4' c roller   Neuro Re-Ed             Pt education  3' 3'     5' 3' 3'    SLR 3x10 3x12     3x10 3x10 3x10 3x10   Quad sets             Bridges 2x15 2x15     2x10 2x10 2x12 2x12   RDLs             Prone hip extension 2x15 2x15     2x10 2x10, 1x5 2x12 2x12   Sled push for TKE 2 laps 10# 2 laps 15#           Ther Ex             Upright bike for ROM 10' 10'     9' lvl 1 9' lvl 1 10' 10'   SL leg press 55#   65# 2x10 65# 3x10     3x10 45# 3x10 55# NV 55# 2 x10     65# 10x   Minisquats             Knee flex stretch on step        10x10"     Sciatic nerve glides             Prone quad stretch 20" 5x 20" 5x     5x20" 5x20" 5x20" 20" 5x   S/L hip abd 2x12 2x12     2x10 2x10 2x12 2x12   Clamshell GTB 2x12 GTB 2x12     2x10 grn 2x10 grn 2x12 grn GTB 2x12   Ther Activity             Fwd step downs       3x10 4" 3x10 4" 3x10 4" 6" step 15x   Lat step downs          6" step 2x10   Sit to stands c airex 2x10 2x10     NV NV 2x6 2x10    Stairs negotiation             Gait Training                                       Modalities

## 2023-11-13 ENCOUNTER — OFFICE VISIT (OUTPATIENT)
Dept: PHYSICAL THERAPY | Facility: CLINIC | Age: 83
End: 2023-11-13
Payer: MEDICARE

## 2023-11-13 DIAGNOSIS — Z96.651 STATUS POST TOTAL RIGHT KNEE REPLACEMENT: ICD-10-CM

## 2023-11-13 DIAGNOSIS — M17.11 PRIMARY OSTEOARTHRITIS OF RIGHT KNEE: Primary | ICD-10-CM

## 2023-11-13 PROCEDURE — 97110 THERAPEUTIC EXERCISES: CPT

## 2023-11-13 NOTE — PROGRESS NOTES
Daily Note     Today's date: 2023  Patient name: Bartolo Downing  : 1940  MRN: [id number]  Referring provider: Erica Melo MD  Dx:   Encounter Diagnosis     ICD-10-CM    1. Primary osteoarthritis of right knee  M17.11       2. Status post total right knee replacement  Z96.651                      Subjective: Pt reports some stiff ness in the morning. Objective: See treatment diary below      Assessment: Tolerated treatment well. Patient demonstrated fatigue post treatment      Plan: Continue per plan of care.       Precautions: R TKA   s  POC expires MC 30 Days PT/OT + Visit Limit?    [facility]                Manuals 11/8 11/10FOTO 11/13    10/27 10/30 11/1 11/3   PROM R knee flex        3'     R knee extension stretch             Patella mobs R knee        1'     Hamstring stretching 5x20" 5x20" 5x20"      5x20" 20" 5x   IASTM R ITB 4' c roller 4' c roller 4' c roller    4' c roller 4' c roller 4' c roller 4' c roller   Neuro Re-Ed             Pt education  3' 3'     5' 3' 3'    SLR 3x10 3x12 3x12    3x10 3x10 3x10 3x10   Quad sets             Bridges 2x15 2x15     2x10 2x10 2x12 2x12   RDLs             Prone hip extension 2x15 2x15 2x15    2x10 2x10, 1x5 2x12 2x12   Sled push for TKE 2 laps 10# 2 laps 15# 2 laps 15#          Ther Ex             Upright bike for ROM 10' 10' 10'    9' lvl 1 9' lvl 1 10' 10'   SL leg press 55#   65# 2x10 65# 3x10 65# 3x10    3x10 45# 3x10 55# NV 55# 2 x10     65# 10x   Minisquats             Knee flex stretch on step        10x10"     Sciatic nerve glides             Prone quad stretch 20" 5x 20" 5x 20"x5    5x20" 5x20" 5x20" 20" 5x   S/L hip abd 2x12 2x12 2x12    2x10 2x10 2x12 2x12   Clamshell GTB 2x12 GTB 2x12 Blk 2x12    2x10 grn 2x10 grn 2x12 grn GTB 2x12   Ther Activity             Fwd step downs   6" 2x10    3x10 4" 3x10 4" 3x10 4" 6" step 15x   Lat step downs          6" step 2x10   Sit to stands c airex 2x10 2x10     NV NV 2x6 2x10 Stairs negotiation             Gait Training                                       Modalities

## 2023-11-15 ENCOUNTER — APPOINTMENT (OUTPATIENT)
Dept: PHYSICAL THERAPY | Facility: CLINIC | Age: 83
End: 2023-11-15
Payer: MEDICARE

## 2023-11-17 ENCOUNTER — OFFICE VISIT (OUTPATIENT)
Dept: PHYSICAL THERAPY | Facility: CLINIC | Age: 83
End: 2023-11-17
Payer: MEDICARE

## 2023-11-17 DIAGNOSIS — Z96.651 STATUS POST TOTAL RIGHT KNEE REPLACEMENT: ICD-10-CM

## 2023-11-17 DIAGNOSIS — M17.11 PRIMARY OSTEOARTHRITIS OF RIGHT KNEE: Primary | ICD-10-CM

## 2023-11-17 PROCEDURE — 97110 THERAPEUTIC EXERCISES: CPT

## 2023-11-17 PROCEDURE — 97112 NEUROMUSCULAR REEDUCATION: CPT

## 2023-11-17 NOTE — PROGRESS NOTES
Daily Note     Today's date: 2023  Patient name: Christian Miller  : 1940  MRN: [id number]  Referring provider: Catarina Arnold MD  Dx:   Encounter Diagnosis     ICD-10-CM    1. Primary osteoarthritis of right knee  M17.11       2. Status post total right knee replacement  Z96.651           Start Time: 0800  Stop Time: 0846  Total time in clinic (min): 46 minutes    Subjective: Pt reports he did a lot of walking and flights of stairs this week and his R knee held up very well. Objective: See treatment diary below      Assessment: Tolerated treatment well. Patient demonstrated fatigue post treatment, exhibited good technique with therapeutic exercises, and would benefit from continued PT. Pt able to progress repetitions of some exercises as tolerated. Good tolerance to progressions, continues to get some relief with IASTM to IT band. Plan: Continue per plan of care. Progress treatment as tolerated.        Precautions: R TKA   s  POC expires MC 30 Days PT/OT + Visit Limit?    [facility]                Manuals 11/8 11/10FOTO 11/13 11/16   10/27 10/30 11/1 11/3   PROM R knee flex        3'     R knee extension stretch             Patella mobs R knee        1'     Hamstring stretching 5x20" 5x20" 5x20" 5x20"     5x20" 20" 5x   IASTM R ITB 4' c roller 4' c roller 4' c roller 4' c roller   4' c roller 4' c roller 4' c roller 4' c roller   Neuro Re-Ed             Pt education  3' 3'  1'   5' 3' 3'    SLR 3x10 3x12 3x12 3x12   3x10 3x10 3x10 3x10   Quad sets             Bridges 2x15 2x15     2x10 2x10 2x12 2x12   RDLs             Prone hip extension 2x15 2x15 2x15 2x15   2x10 2x10, 1x5 2x12 2x12   Sled push for TKE 2 laps 10# 2 laps 15# 2 laps 15# 2 laps 15#         Ther Ex             Upright bike for ROM 10' 10' 10' 10'   9' lvl 1 9' lvl 1 10' 10'   SL leg press 55#   65# 2x10 65# 3x10 65# 3x10 75# 3x10   3x10 45# 3x10 55# NV 55# 2 x10     65# 10x   Minisquats             Knee flex stretch on step        10x10"     Sciatic nerve glides             Prone quad stretch 20" 5x 20" 5x 20"x5 20"x5   5x20" 5x20" 5x20" 20" 5x   S/L hip abd 2x12 2x12 2x12 2x15   2x10 2x10 2x12 2x12   Clamshell GTB 2x12 GTB 2x12 Blk 2x12 2x15 blue   2x10 grn 2x10 grn 2x12 grn GTB 2x12   Ther Activity             Fwd step downs   6" 2x10    3x10 4" 3x10 4" 3x10 4" 6" step 15x   Lat step downs          6" step 2x10   Sit to stands c airex 2x10 2x10  2x10   NV NV 2x6 2x10    Stairs negotiation             Gait Training                                       Modalities

## 2023-11-20 ENCOUNTER — OFFICE VISIT (OUTPATIENT)
Dept: PHYSICAL THERAPY | Facility: CLINIC | Age: 83
End: 2023-11-20
Payer: MEDICARE

## 2023-11-20 DIAGNOSIS — M17.11 PRIMARY OSTEOARTHRITIS OF RIGHT KNEE: Primary | ICD-10-CM

## 2023-11-20 DIAGNOSIS — Z96.651 STATUS POST TOTAL RIGHT KNEE REPLACEMENT: ICD-10-CM

## 2023-11-20 PROCEDURE — 97110 THERAPEUTIC EXERCISES: CPT

## 2023-11-20 PROCEDURE — 97112 NEUROMUSCULAR REEDUCATION: CPT

## 2023-11-20 PROCEDURE — 97140 MANUAL THERAPY 1/> REGIONS: CPT

## 2023-11-20 NOTE — PROGRESS NOTES
Daily Note     Today's date: 2023  Patient name: Jaqueline Elias  : 1940  MRN: [id number]  Referring provider: Jagruti Griffiths MD  Dx:   Encounter Diagnosis     ICD-10-CM    1. Primary osteoarthritis of right knee  M17.11       2. Status post total right knee replacement  Z96.651           Start Time: 0800  Stop Time: 0845  Total time in clinic (min): 45 minutes    Subjective: Pt reports his knee was tight was yesterday and may have had some residual soreness from higher activity. Objective: See treatment diary below      Assessment: Tolerated treatment well. Patient demonstrated fatigue post treatment, exhibited good technique with therapeutic exercises, and would benefit from continued PT. Pt able to slightly progress repetitions and resistance as charted. Pt tolerated progressions well, fatigued at end of session. Pt demonstrating less tenderness with palpation to R IT band. Plan: Continue per plan of care. Progress treatment as tolerated.        Precautions: R TKA   s  POC expires MC 30 Days PT/OT + Visit Limit?    [facility]                Manuals 11/8 11/10FOTO         PROM R knee flex             R knee extension stretch             Patella mobs R knee             Hamstring stretching 5x20" 5x20" 5x20" 5x20" 5x20"        IASTM R ITB 4' c roller 4' c roller 4' c roller 4' c roller 4' c roller        Neuro Re-Ed             Pt education  3' 3'  1'         SLR 3x10 3x12 3x12 3x12 3x12        Quad sets             Bridges 2x15 2x15           RDLs             Prone hip extension 2x15 2x15 2x15 2x15 2x15        Sled push for TKE 2 laps 10# 2 laps 15# 2 laps 15# 2 laps 15# 2 laps 20#        Ther Ex             Upright bike for ROM 10' 10' 10' 10' 10'        SL leg press 55#   65# 2x10 65# 3x10 65# 3x10 75# 3x10 75# 3x12        Minisquats             Knee flex stretch on step             Sciatic nerve glides             Prone quad stretch 20" 5x 20" 5x 20"x5 20"x5 20"x5        S/L hip abd 2x12 2x12 2x12 2x15 2x15        Clamshell GTB 2x12 GTB 2x12 Blk 2x12 2x15 blue 2x15 blue        Ther Activity             Fwd step downs   6" 2x10          Lat step downs             Sit to stands c airex 2x10 2x10  2x10 2x12        Stairs negotiation             Gait Training                                       Modalities

## 2023-11-22 ENCOUNTER — APPOINTMENT (OUTPATIENT)
Dept: PHYSICAL THERAPY | Facility: CLINIC | Age: 83
End: 2023-11-22
Payer: MEDICARE

## 2023-11-27 ENCOUNTER — OFFICE VISIT (OUTPATIENT)
Dept: PHYSICAL THERAPY | Facility: CLINIC | Age: 83
End: 2023-11-27
Payer: MEDICARE

## 2023-11-27 DIAGNOSIS — Z96.651 STATUS POST TOTAL RIGHT KNEE REPLACEMENT: ICD-10-CM

## 2023-11-27 DIAGNOSIS — M17.11 PRIMARY OSTEOARTHRITIS OF RIGHT KNEE: Primary | ICD-10-CM

## 2023-11-27 PROCEDURE — 97110 THERAPEUTIC EXERCISES: CPT

## 2023-11-27 PROCEDURE — 97140 MANUAL THERAPY 1/> REGIONS: CPT

## 2023-11-27 PROCEDURE — 97112 NEUROMUSCULAR REEDUCATION: CPT

## 2023-11-27 NOTE — PROGRESS NOTES
Daily Note     Today's date: 2023  Patient name: Candi Pascal  : 1940  MRN: [id number]  Referring provider: Sara Robert MD  Dx:   Encounter Diagnosis     ICD-10-CM    1. Primary osteoarthritis of right knee  M17.11       2. Status post total right knee replacement  Z96.651           Start Time: 0800  Stop Time: 0845  Total time in clinic (min): 45 minutes    Subjective: Pt reports he did less exercises over the weekend and feels a little more stiffness in the back of his R leg. Objective: See treatment diary below      Assessment: Tolerated treatment well. Patient demonstrated fatigue post treatment, exhibited good technique with therapeutic exercises, and would benefit from continued PT. Pt able to progress repetitions as tolerated and maintain proper technique with exercises. Added sets for leg press, plan to add ankle weight for straight leg raises as tolerated next session. Plan: Continue per plan of care. Progress treatment as tolerated.        Precautions: R TKA   s  POC expires MC 30 Days PT/OT + Visit Limit?    [facility]                Manuals 11/8 11/10FOTO        PROM R knee flex             R knee extension stretch             Patella mobs R knee             Hamstring stretching 5x20" 5x20" 5x20" 5x20" 5x20" 6x15"       IASTM R ITB 4' c roller 4' c roller 4' c roller 4' c roller 4' c roller 3' c roller       Neuro Re-Ed             Pt education  3' 3'  1'         SLR 3x10 3x12 3x12 3x12 3x12 3x10-15 Add 1#      Quad sets             Bridges 2x15 2x15           RDLs             Prone hip extension 2x15 2x15 2x15 2x15 2x15 2x15       Sled push for TKE 2 laps 10# 2 laps 15# 2 laps 15# 2 laps 15# 2 laps 20# 2 laps 30#       Ther Ex             Upright bike for ROM 10' 10' 10' 10' 10' 10'       SL leg press 55#   65# 2x10 65# 3x10 65# 3x10 75# 3x10 75# 3x12 75# 4x12       Minisquats             Knee flex stretch on step             Sciatic nerve glides             Prone quad stretch 20" 5x 20" 5x 20"x5 20"x5 20"x5 20"x5       S/L hip abd 2x12 2x12 2x12 2x15 2x15 3x12       Clamshell GTB 2x12 GTB 2x12 Blk 2x12 2x15 blue 2x15 blue 3x12 blue       Ther Activity             Fwd step downs   6" 2x10          Lat step downs             Sit to stands c airex 2x10 2x10  2x10 2x12 2x15       Stairs negotiation             Gait Training                                       Modalities

## 2023-11-30 ENCOUNTER — OFFICE VISIT (OUTPATIENT)
Dept: PHYSICAL THERAPY | Facility: CLINIC | Age: 83
End: 2023-11-30
Payer: MEDICARE

## 2023-11-30 DIAGNOSIS — M17.11 PRIMARY OSTEOARTHRITIS OF RIGHT KNEE: Primary | ICD-10-CM

## 2023-11-30 DIAGNOSIS — Z96.651 STATUS POST TOTAL RIGHT KNEE REPLACEMENT: ICD-10-CM

## 2023-11-30 PROCEDURE — 97140 MANUAL THERAPY 1/> REGIONS: CPT

## 2023-11-30 PROCEDURE — 97110 THERAPEUTIC EXERCISES: CPT

## 2023-11-30 PROCEDURE — 97112 NEUROMUSCULAR REEDUCATION: CPT

## 2023-11-30 NOTE — PROGRESS NOTES
Daily Note     Today's date: 2023  Patient name: Bartolo Downing  : 1940  MRN: [id number]  Referring provider: Erica Melo MD  Dx:   Encounter Diagnosis     ICD-10-CM    1. Primary osteoarthritis of right knee  M17.11       2. Status post total right knee replacement  Z96.651           Start Time: 0800  Stop Time: 0845  Total time in clinic (min): 45 minutes    Subjective: Pt reports his knee feels a little looser this morning and he was able to do more with the steps yesterday. Objective: See treatment diary below      Assessment: Tolerated treatment well. Patient demonstrated fatigue post treatment, exhibited good technique with therapeutic exercises, and would benefit from continued PT. Pt able to progress by adding 1 lb ankle weight to leg raising exercises. Pt tolerated this well and still able to do 3 sets with good form for straight leg raises. Plan: Continue per plan of care. Progress treatment as tolerated.        Precautions: R TKA   s  POC expires MC 30 Days PT/OT + Visit Limit?    [facility]                Manuals 11/8 11/10FOTO       PROM R knee flex             R knee extension stretch             Patella mobs R knee             Hamstring stretching 5x20" 5x20" 5x20" 5x20" 5x20" 6x15" 6x15"      IASTM R ITB 4' c roller 4' c roller 4' c roller 4' c roller 4' c roller 3' c roller 3' c roller      Neuro Re-Ed             Pt education  3' 3'  1'         SLR 3x10 3x12 3x12 3x12 3x12 3x10-15 3x10 1#      Quad sets             Bridges 2x15 2x15           RDLs             Prone hip extension 2x15 2x15 2x15 2x15 2x15 2x15 3x10 1#      Sled push for TKE 2 laps 10# 2 laps 15# 2 laps 15# 2 laps 15# 2 laps 20# 2 laps 30# 2 laps 30#      Ther Ex             Upright bike for ROM 10' 10' 10' 10' 10' 10' 10' lvl 1-3      SL leg press 55#   65# 2x10 65# 3x10 65# 3x10 75# 3x10 75# 3x12 75# 4x12 3x12 75-85#      Minisquats             Knee flex stretch on step             Sciatic nerve glides             Prone quad stretch 20" 5x 20" 5x 20"x5 20"x5 20"x5 20"x5 20"x5      S/L hip abd 2x12 2x12 2x12 2x15 2x15 3x12 2x10 1#      Clamshell GTB 2x12 GTB 2x12 Blk 2x12 2x15 blue 2x15 blue 3x12 blue 3x12 blue      Ther Activity             Fwd step downs   6" 2x10          Lat step downs             Sit to stands c airex 2x10 2x10  2x10 2x12 2x15 2x15      Stairs negotiation             Gait Training                                       Modalities

## 2023-12-04 ENCOUNTER — EVALUATION (OUTPATIENT)
Dept: PHYSICAL THERAPY | Facility: CLINIC | Age: 83
End: 2023-12-04
Payer: MEDICARE

## 2023-12-04 DIAGNOSIS — M17.11 PRIMARY OSTEOARTHRITIS OF RIGHT KNEE: Primary | ICD-10-CM

## 2023-12-04 DIAGNOSIS — Z96.651 STATUS POST TOTAL RIGHT KNEE REPLACEMENT: ICD-10-CM

## 2023-12-04 PROCEDURE — 97112 NEUROMUSCULAR REEDUCATION: CPT

## 2023-12-04 PROCEDURE — 97530 THERAPEUTIC ACTIVITIES: CPT

## 2023-12-04 PROCEDURE — 97110 THERAPEUTIC EXERCISES: CPT

## 2023-12-04 NOTE — PROGRESS NOTES
PT Re-Evaluation     Today's date: 2023  Patient name: Jaqueline Elias  : 1940  MRN: [id number]  Referring provider: Jagruti Griffiths MD  Dx:   Encounter Diagnosis     ICD-10-CM    1. Primary osteoarthritis of right knee  M17.11       2. Status post total right knee replacement  Z96.651           Start Time: 0800  Stop Time: 0845  Total time in clinic (min): 45 minutes    Assessment  Assessment details: Pt is a 82 y.o. male presenting to physical therapy with s/p R TKA on 2023. Upon re-evaluation, pt demonstrates improved ROM of R knee and improved strength of RLE. Pt demonstrates improvement of sx located near his IT band, with less tenderness to palpation and improved active movement without discomfort. Will continue to address lingering impairments of R knee ROM and muscular strength/endurance in order to improve functional limitations of distance ambulation, trail ambulation, and dancing. Pt is a good candidate for continued skilled PT to address impairments and facilitate return to prior level of function. Impairments: abnormal gait, abnormal or restricted ROM, activity intolerance, impaired balance, impaired physical strength and pain with function  Functional limitations: distance ambulation, trail ambulation, and dancing  Symptom irritability: low  Goals  STG 4 - weeks  1. Patient will demonstrate improved R knee strength to 4+/5 in all planes to facilitate functional ability. -ongiong  2. Patient will demonstrate improved R knee flexion AROM to 110 degrees to facilitate functional ability. -met  LTG 8 - weeks  1. Patient will demonstrate improved R knee extensor lag to 0 degrees to facilitate functional ability. -ongoing  2.  Patient will demonstrate increased FOTO score by 10 points or more from baseline. -ongoing    Plan  Patient would benefit from: PT eval and skilled physical therapy  Planned modality interventions: cryotherapy, thermotherapy: hydrocollator packs and unattended electrical stimulation  Planned therapy interventions: ADL retraining, flexibility, functional ROM exercises, home exercise program, joint mobilization, manual therapy, massage, patient education, strengthening, stretching, therapeutic activities, therapeutic exercise, neuromuscular re-education, gait training and balance/weight bearing training  Frequency: 3x week  Duration in weeks: 8  Plan of Care beginning date: 2023  Plan of Care expiration date: 2024  Treatment plan discussed with: patient      Subjective Evaluation    History of Present Illness  Mechanism of injury: Pt presents s/p R knee replacement on . Order to ambulate with assistive device for 3 weeks after surgery. Pt used walker for about the first week, now carries cane with him for support. Pt reports he had home therapy for 5 sessions for 2 weeks and was pushed adequately. Pt still has some swelling in R knee and lower R leg, did get swelling in past with BP medication and used compression stockings. Upon re-evaluation, pt reports he has been getting minimal pain in his R knee, his pain is more in his L knee now. Pt gets more stiffness than pain in his R knee/leg, reports stiffness in the morning and sometimes when he is walking. Pt doing well going up and down stairs and getting up from chairs. Pt still tentative with distance ambulation and outdoor ambulation on trails. Patient Goals  Patient goals for therapy: decreased pain, decreased edema, increased motion and increased strength  Patient goal: Walking freely and be less conscious of knee; improve stairs  Pain  Current pain ratin  At best pain ratin  At worst pain ratin  Location: R knee  Quality: dull ache and discomfort  Relieving factors: medications  Aggravating factors: stair climbing and walking  Progression: improved        Objective     Tenderness     Right Knee   No tenderness in the ITB.      Active Range of Motion   Left Knee   Flexion: 119 degrees Extension: -2 degrees     Right Knee   Flexion: 116 degrees   Extension: 0 degrees   Extensor lag: 3 degrees     Passive Range of Motion   Left Knee   Flexion: 119 degrees     Strength/Myotome Testing     Left Hip   Planes of Motion   Flexion: 5  Extension: 5  Abduction: 5    Right Hip   Planes of Motion   Flexion: 5  Extension: 4+  Abduction: 4+    Left Knee   Flexion: 5  Extension: 5    Right Knee   Flexion: 4+  Extension: 5    Tests     Additional Tests Details  No pain with active knee flexion combined with hip flexion    Ambulation     Ambulation: Stairs   Ascend stairs: independent  Pattern: reciprocal  Descend stairs: independent  Pattern: reciprocal    Observational Gait   Walking speed and stride length within functional limits. Precautions: R TKA 9/9    POC expires MC 30 Days PT/OT + Visit Limit?    1/29 1/4 BOMN                  Manuals 11/8 11/10FOTO 11/13 11/16 11/20 11/27 11/30 12/4 RE     PROM R knee flex             R knee extension stretch             Patella mobs R knee             Hamstring stretching 5x20" 5x20" 5x20" 5x20" 5x20" 6x15" 6x15"      IASTM R ITB 4' c roller 4' c roller 4' c roller 4' c roller 4' c roller 3' c roller 3' c roller 5' c roller     Neuro Re-Ed             Pt education  3' 3'  1'         SLR 3x10 3x12 3x12 3x12 3x12 3x10-15 3x10 1# 2x15 1#     Quad sets             Bridges 2x15 2x15           RDLs             Prone hip extension 2x15 2x15 2x15 2x15 2x15 2x15 3x10 1# 2x12 1#     Sled push for TKE 2 laps 10# 2 laps 15# 2 laps 15# 2 laps 15# 2 laps 20# 2 laps 30# 2 laps 30# 2 laps 30#     Ther Ex             Upright bike for ROM 10' 10' 10' 10' 10' 10' 10' lvl 1-3 10' lvl 3     SL leg press 55#   65# 2x10 65# 3x10 65# 3x10 75# 3x10 75# 3x12 75# 4x12 3x12 75-85# 3x12 75-85#     Minisquats             Knee flex stretch on step             Sciatic nerve glides             Prone quad stretch 20" 5x 20" 5x 20"x5 20"x5 20"x5 20"x5 20"x5 20"x5     S/L hip abd 2x12 2x12 2x12 2x15 2x15 3x12 2x10 1# 2x10 1#     Clamshell GTB 2x12 GTB 2x12 Blk 2x12 2x15 blue 2x15 blue 3x12 blue 3x12 blue      Ther Activity             Fwd step downs   6" 2x10          Lat step downs             Sit to stands c airex 2x10 2x10  2x10 2x12 2x15 2x15 2x15 Try s airex    Stairs negotiation             Gait Training                                       Modalities

## 2023-12-04 NOTE — LETTER
2023    Roseann Galvez MD   [address] [address]    Patient: Terri Jackman   YOB: 1940   Date of Visit: 2023     Encounter Diagnosis     ICD-10-CM    1. Primary osteoarthritis of right knee  M17.11       2. Status post total right knee replacement  Z96.651           Dear Dr. Eugenie Trevino:    Thank you for your recent referral of Terri Jackman. Please review the attached evaluation summary from Rian's recent visit. Please verify that you agree with the plan of care by signing the attached order. If you have any questions or concerns, please do not hesitate to call. I sincerely appreciate the opportunity to share in the care of one of your patients and hope to have another opportunity to work with you in the near future. Sincerely,    Alejandra Choi, PT      Referring Provider:      I certify that I have read the below Plan of Care and certify the need for these services furnished under this plan of treatment while under my care. Roseann Galvez MD   [facility] [id number]  Via Fax: [phone]          PT Re-Evaluation     Today's date: 2023  Patient name: Terri Jackman  : 1940  MRN: [id number]  Referring provider: Roseann Galvez MD  Dx:   Encounter Diagnosis     ICD-10-CM    1. Primary osteoarthritis of right knee  M17.11       2. Status post total right knee replacement  Z96.651           Start Time: 0800  Stop Time: 0845  Total time in clinic (min): 45 minutes    Assessment  Assessment details: Pt is a 82 y.o. male presenting to physical therapy with s/p R TKA on 2023. Upon re-evaluation, pt demonstrates improved ROM of R knee and improved strength of RLE. Pt demonstrates improvement of sx located near his IT band, with less tenderness to palpation and improved active movement without discomfort.  Will continue to address lingering impairments of R knee ROM and muscular strength/endurance in order to improve functional limitations of distance ambulation, trail ambulation, and dancing. Pt is a good candidate for continued skilled PT to address impairments and facilitate return to prior level of function. Impairments: abnormal gait, abnormal or restricted ROM, activity intolerance, impaired balance, impaired physical strength and pain with function  Functional limitations: distance ambulation, trail ambulation, and dancing  Symptom irritability: low  Goals  STG 4 - weeks  1. Patient will demonstrate improved R knee strength to 4+/5 in all planes to facilitate functional ability. -ongiong  2. Patient will demonstrate improved R knee flexion AROM to 110 degrees to facilitate functional ability. -met  LTG 8 - weeks  1. Patient will demonstrate improved R knee extensor lag to 0 degrees to facilitate functional ability. -ongoing  2. Patient will demonstrate increased FOTO score by 10 points or more from baseline. -ongoing    Plan  Patient would benefit from: PT eval and skilled physical therapy  Planned modality interventions: cryotherapy, thermotherapy: hydrocollator packs and unattended electrical stimulation  Planned therapy interventions: ADL retraining, flexibility, functional ROM exercises, home exercise program, joint mobilization, manual therapy, massage, patient education, strengthening, stretching, therapeutic activities, therapeutic exercise, neuromuscular re-education, gait training and balance/weight bearing training  Frequency: 3x week  Duration in weeks: 8  Plan of Care beginning date: 12/4/2023  Plan of Care expiration date: 1/29/2024  Treatment plan discussed with: patient      Subjective Evaluation    History of Present Illness  Mechanism of injury: Pt presents s/p R knee replacement on September 9th. Order to ambulate with assistive device for 3 weeks after surgery. Pt used walker for about the first week, now carries cane with him for support.  Pt reports he had home therapy for 5 sessions for 2 weeks and was pushed adequately. Pt still has some swelling in R knee and lower R leg, did get swelling in past with BP medication and used compression stockings. Upon re-evaluation, pt reports he has been getting minimal pain in his R knee, his pain is more in his L knee now. Pt gets more stiffness than pain in his R knee/leg, reports stiffness in the morning and sometimes when he is walking. Pt doing well going up and down stairs and getting up from chairs. Pt still tentative with distance ambulation and outdoor ambulation on trails. Patient Goals  Patient goals for therapy: decreased pain, decreased edema, increased motion and increased strength  Patient goal: Walking freely and be less conscious of knee; improve stairs  Pain  Current pain ratin  At best pain ratin  At worst pain ratin  Location: R knee  Quality: dull ache and discomfort  Relieving factors: medications  Aggravating factors: stair climbing and walking  Progression: improved        Objective     Tenderness     Right Knee   No tenderness in the ITB. Active Range of Motion   Left Knee   Flexion: 119 degrees   Extension: -2 degrees     Right Knee   Flexion: 116 degrees   Extension: 0 degrees   Extensor lag: 3 degrees     Passive Range of Motion   Left Knee   Flexion: 119 degrees     Strength/Myotome Testing     Left Hip   Planes of Motion   Flexion: 5  Extension: 5  Abduction: 5    Right Hip   Planes of Motion   Flexion: 5  Extension: 4+  Abduction: 4+    Left Knee   Flexion: 5  Extension: 5    Right Knee   Flexion: 4+  Extension: 5    Tests     Additional Tests Details  No pain with active knee flexion combined with hip flexion    Ambulation     Ambulation: Stairs   Ascend stairs: independent  Pattern: reciprocal  Descend stairs: independent  Pattern: reciprocal    Observational Gait   Walking speed and stride length within functional limits. Precautions: R TKA     POC expires MC 30 Days PT/OT + Visit Limit? 1/29 1/4 BOMN                  Manuals 11/8 11/10FOTO 11/13 11/16 11/20 11/27 11/30 12/4 RE     PROM R knee flex             R knee extension stretch             Patella mobs R knee             Hamstring stretching 5x20" 5x20" 5x20" 5x20" 5x20" 6x15" 6x15"      IASTM R ITB 4' c roller 4' c roller 4' c roller 4' c roller 4' c roller 3' c roller 3' c roller 5' c roller     Neuro Re-Ed             Pt education  3' 3'  1'         SLR 3x10 3x12 3x12 3x12 3x12 3x10-15 3x10 1# 2x15 1#     Quad sets             Bridges 2x15 2x15           RDLs             Prone hip extension 2x15 2x15 2x15 2x15 2x15 2x15 3x10 1# 2x12 1#     Sled push for TKE 2 laps 10# 2 laps 15# 2 laps 15# 2 laps 15# 2 laps 20# 2 laps 30# 2 laps 30# 2 laps 30#     Ther Ex             Upright bike for ROM 10' 10' 10' 10' 10' 10' 10' lvl 1-3 10' lvl 3     SL leg press 55#   65# 2x10 65# 3x10 65# 3x10 75# 3x10 75# 3x12 75# 4x12 3x12 75-85# 3x12 75-85#     Minisquats             Knee flex stretch on step             Sciatic nerve glides             Prone quad stretch 20" 5x 20" 5x 20"x5 20"x5 20"x5 20"x5 20"x5 20"x5     S/L hip abd 2x12 2x12 2x12 2x15 2x15 3x12 2x10 1# 2x10 1#     Clamshell GTB 2x12 GTB 2x12 Blk 2x12 2x15 blue 2x15 blue 3x12 blue 3x12 blue      Ther Activity             Fwd step downs   6" 2x10          Lat step downs             Sit to stands c airex 2x10 2x10  2x10 2x12 2x15 2x15 2x15 Try s airex    Stairs negotiation             Gait Training                                       Modalities

## 2023-12-06 ENCOUNTER — APPOINTMENT (OUTPATIENT)
Dept: PHYSICAL THERAPY | Facility: CLINIC | Age: 83
End: 2023-12-06
Payer: MEDICARE

## 2023-12-08 ENCOUNTER — OFFICE VISIT (OUTPATIENT)
Dept: PHYSICAL THERAPY | Facility: CLINIC | Age: 83
End: 2023-12-08
Payer: MEDICARE

## 2023-12-08 DIAGNOSIS — Z96.651 STATUS POST TOTAL RIGHT KNEE REPLACEMENT: ICD-10-CM

## 2023-12-08 DIAGNOSIS — M17.11 PRIMARY OSTEOARTHRITIS OF RIGHT KNEE: Primary | ICD-10-CM

## 2023-12-08 PROCEDURE — 97112 NEUROMUSCULAR REEDUCATION: CPT

## 2023-12-08 PROCEDURE — 97110 THERAPEUTIC EXERCISES: CPT

## 2023-12-08 NOTE — PROGRESS NOTES
Daily Note     Today's date: 2023  Patient name: Lina Rainey  : 1940  MRN: [id number]  Referring provider: Anjali Bonds MD  Dx:   Encounter Diagnosis     ICD-10-CM    1. Primary osteoarthritis of right knee  M17.11       2. Status post total right knee replacement  Z96.651           Start Time: 0800  Stop Time: 0846  Total time in clinic (min): 46 minutes    Subjective: Pt reports his knee has been good this week. Objective: See treatment diary below      Assessment: Tolerated treatment well. Patient demonstrated fatigue post treatment, exhibited good technique with therapeutic exercises, and would benefit from continued PT. Pt able to perform sit to stands without airex for added chair height this session. Also progressed resistance for straight leg raises and progressed repetitions for other exercises. Pt did well with all progressions today. Plan: Continue per plan of care. Progress treatment as tolerated. Precautions: R TKA     POC expires MC 30 Days PT/OT + Visit Limit?     BOMN                  Manuals 11/8 11/10FOTO  RE     PROM R knee flex             R knee extension stretch             Patella mobs R knee             Hamstring stretching 5x20" 5x20" 5x20" 5x20" 5x20" 6x15" 6x15"  NV    IASTM R ITB 4' c roller 4' c roller 4' c roller 4' c roller 4' c roller 3' c roller 3' c roller 5' c roller 4' c roller    Neuro Re-Ed             Pt education  3' 3'  1'         SLR 3x10 3x12 3x12 3x12 3x12 3x10-15 3x10 1# 2x15 1# 2x12-15#    [employer] 2x15 2x15           RDLs             Prone hip extension 2x15 2x15 2x15 2x15 2x15 2x15 3x10 1# 2x12 1# 2x12 1#    Sled push for TKE 2 laps 10# 2 laps 15# 2 laps 15# 2 laps 15# 2 laps 20# 2 laps 30# 2 laps 30# 2 laps 30# NP    Ther Ex             Upright bike for ROM 10' 10' 10' 10' 10' 10' 10' lvl 1-3 10' lvl 3 10' lvl 3    SL leg press 55#   65# 2x10 65# 3x10 65# 3x10 75# 3x10 75# 3x12 75# 4x12 3x12 75-85# 3x12 75-85# 3x12 85# seat 8    Minisquats             Knee flex stretch on step             Sciatic nerve glides             Prone quad stretch 20" 5x 20" 5x 20"x5 20"x5 20"x5 20"x5 20"x5 20"x5 20"x5    S/L hip abd 2x12 2x12 2x12 2x15 2x15 3x12 2x10 1# 2x10 1# 2x12 +5 1#    Clamshell GTB 2x12 GTB 2x12 Blk 2x12 2x15 blue 2x15 blue 3x12 blue 3x12 blue  3x15 blue    Ther Activity             Fwd step downs   6" 2x10      2x7 8"    Lat step downs             Sit to stands c airex 2x10 2x10  2x10 2x12 2x15 2x15 2x15 2x10 no airex    Stairs negotiation             Gait Training                                       Modalities

## 2023-12-11 ENCOUNTER — APPOINTMENT (OUTPATIENT)
Dept: PHYSICAL THERAPY | Facility: CLINIC | Age: 83
End: 2023-12-11
Payer: MEDICARE

## 2023-12-13 ENCOUNTER — APPOINTMENT (OUTPATIENT)
Dept: PHYSICAL THERAPY | Facility: CLINIC | Age: 83
End: 2023-12-13
Payer: MEDICARE

## 2023-12-15 ENCOUNTER — OFFICE VISIT (OUTPATIENT)
Dept: PHYSICAL THERAPY | Facility: CLINIC | Age: 83
End: 2023-12-15
Payer: MEDICARE

## 2023-12-15 DIAGNOSIS — Z96.651 STATUS POST TOTAL RIGHT KNEE REPLACEMENT: ICD-10-CM

## 2023-12-15 DIAGNOSIS — M17.11 PRIMARY OSTEOARTHRITIS OF RIGHT KNEE: Primary | ICD-10-CM

## 2023-12-15 PROCEDURE — 97110 THERAPEUTIC EXERCISES: CPT

## 2023-12-15 PROCEDURE — 97112 NEUROMUSCULAR REEDUCATION: CPT

## 2023-12-15 PROCEDURE — 97140 MANUAL THERAPY 1/> REGIONS: CPT

## 2023-12-15 NOTE — PROGRESS NOTES
Daily Note     Today's date: 12/15/2023  Patient name: Ameena Rolle  : 1940  MRN: [id number]  Referring provider: Fox Engel MD  Dx:   Encounter Diagnosis     ICD-10-CM    1. Primary osteoarthritis of right knee  M17.11       2. Status post total right knee replacement  Z96.651           Start Time: 0845  Stop Time: 928  Total time in clinic (min): 43 minutes    Subjective: Pt did a lot of walking around the city, felt his L knee more than the R one. Pt did get some knee soreness afterwards. Objective: See treatment diary below      Assessment: Tolerated treatment well. Patient demonstrated fatigue post treatment, exhibited good technique with therapeutic exercises, and would benefit from continued PT. Pt able to progress to 2 lb resistance for leg raises, tolerated progression well. Continues to respond well to current interventions and will progress appropriately. Plan: Continue per plan of care. Progress treatment as tolerated. Precautions: R TKA     POC expires MC 30 Days PT/OT + Visit Limit?     BOMN                  Manuals 11/8 11/10FOTO  RE    PROM R knee flex             R knee extension stretch             Patella mobs R knee             Hamstring stretching 5x20" 5x20" 5x20" 5x20" 5x20" 6x15" 6x15"  NV 6x15"   IASTM R ITB 4' c roller 4' c roller 4' c roller 4' c roller 4' c roller 3' c roller 3' c roller 5' c roller 4' c roller 4' c roller   Neuro Re-Ed             Pt education  3' 3'  1'         SLR 3x10 3x12 3x12 3x12 3x12 3x10-15 3x10 1# 2x15 1# 2x12-1# 3x10 2#   [employer] 2x15 2x15           RDLs             Prone hip extension 2x15 2x15 2x15 2x15 2x15 2x15 3x10 1# 2x12 1# 2x12 1# 3x10 2#   Sled push for TKE 2 laps 10# 2 laps 15# 2 laps 15# 2 laps 15# 2 laps 20# 2 laps 30# 2 laps 30# 2 laps 30# NP    Ther Ex             Upright bike for ROM 10' 10' 10' 10' 10' 10' 10' lvl 1-3 10' lvl 3 10' lvl 3 10' lvl 3   SL leg press 55#   65# 2x10 65# 3x10 65# 3x10 75# 3x10 75# 3x12 75# 4x12 3x12 75-85# 3x12 75-85# 3x12 85# seat 8 3x10-15 85-95# seat 8   Minisquats             Knee flex stretch on step             Sciatic nerve glides             Prone quad stretch 20" 5x 20" 5x 20"x5 20"x5 20"x5 20"x5 20"x5 20"x5 20"x5 20"x5   S/L hip abd 2x12 2x12 2x12 2x15 2x15 3x12 2x10 1# 2x10 1# 2x12 +5 1# 3x10 2#   Clamshell GTB 2x12 GTB 2x12 Blk 2x12 2x15 blue 2x15 blue 3x12 blue 3x12 blue  3x15 blue    Ther Activity             Fwd step downs   6" 2x10      2x7 8"    Lat step downs             Sit to stands c airex 2x10 2x10  2x10 2x12 2x15 2x15 2x15 2x10 no airex NP   Stairs negotiation             Gait Training                                       Modalities

## 2023-12-18 ENCOUNTER — OFFICE VISIT (OUTPATIENT)
Dept: PHYSICAL THERAPY | Facility: CLINIC | Age: 83
End: 2023-12-18
Payer: MEDICARE

## 2023-12-18 DIAGNOSIS — M17.11 PRIMARY OSTEOARTHRITIS OF RIGHT KNEE: Primary | ICD-10-CM

## 2023-12-18 DIAGNOSIS — Z96.651 STATUS POST TOTAL RIGHT KNEE REPLACEMENT: ICD-10-CM

## 2023-12-18 PROCEDURE — 97112 NEUROMUSCULAR REEDUCATION: CPT

## 2023-12-18 PROCEDURE — 97110 THERAPEUTIC EXERCISES: CPT

## 2023-12-18 PROCEDURE — 97140 MANUAL THERAPY 1/> REGIONS: CPT

## 2023-12-18 NOTE — PROGRESS NOTES
"Daily Note     Today's date: 2023  Patient name: Rian Galeana  : 1940  MRN: [id number]  Referring provider: Lynn Bhatt MD  Dx:   Encounter Diagnosis     ICD-10-CM    1. Primary osteoarthritis of right knee  M17.11       2. Status post total right knee replacement  Z96.651           Start Time: 0800  Stop Time: 0845  Total time in clinic (min): 45 minutes    Subjective: Pt reports he was a little sore for a few days since last visit but does feel back to normal today.      Objective: See treatment diary below      Assessment: Tolerated treatment well. Patient demonstrated fatigue post treatment, exhibited good technique with therapeutic exercises, and would benefit from continued PT. Performed resisted standing terminal knee extensions which pt reports more isolated quadriceps than sled push, feels sled push more globally throughout leg, so will alternate these. Good tolerance to exercises, able to re-initiate sit to stands.      Plan: Continue per plan of care.  Progress treatment as tolerated.       Precautions: R TKA     POC expires MC 30 Days PT/OT + Visit Limit?    BOMN                  Manuals  RE    PROM R knee flex             R knee extension stretch             Patella mobs R knee             Hamstring stretching 6x15\"    5x20\" 6x15\" 6x15\"  NV 6x15\"   IASTM R ITB 4' c roller    4' c roller 3' c roller 3' c roller 5' c roller 4' c roller 4' c roller   Neuro Re-Ed             Pt education              SLR 3x15 2#    3x12 3x10-15 3x10 1# 2x15 1# 2x12-1# 3x10 2#   Standing TKE 2x10 5\" grn            Bridges             RDLs             Prone hip extension 3x10 2#    2x15 2x15 3x10 1# 2x12 1# 2x12 1# 3x10 2#   Sled push for TKE     2 laps 20# 2 laps 30# 2 laps 30# 2 laps 30# NP    Ther Ex             Upright bike for ROM 10' lvl 3    10' 10' 10' lvl 1-3 10' lvl 3 10' lvl 3 10' lvl 3   SL leg press 3x10-15 85-95# seat 8    75# 3x12 75# " "4x12 3x12 75-85# 3x12 75-85# 3x12 85# seat 8 3x10-15 85-95# seat 8   Minisquats             Knee flex stretch on step             Sciatic nerve glides             Prone quad stretch 20\"x5    20\"x5 20\"x5 20\"x5 20\"x5 20\"x5 20\"x5   S/L hip abd 3x10-15 2#    2x15 3x12 2x10 1# 2x10 1# 2x12 +5 1# 3x10 2#   Clamshell     2x15 blue 3x12 blue 3x12 blue  3x15 blue    Ther Activity             Fwd step downs         2x7 8\"    Lat step downs             Sit to stands c airex 2x10-12 no airex    2x12 2x15 2x15 2x15 2x10 no airex NP   Stairs negotiation             Gait Training                                       Modalities                                                    "

## 2023-12-20 ENCOUNTER — APPOINTMENT (OUTPATIENT)
Dept: PHYSICAL THERAPY | Facility: CLINIC | Age: 83
End: 2023-12-20
Payer: MEDICARE

## 2023-12-27 ENCOUNTER — APPOINTMENT (OUTPATIENT)
Dept: PHYSICAL THERAPY | Facility: CLINIC | Age: 83
End: 2023-12-27
Payer: MEDICARE

## 2023-12-29 ENCOUNTER — APPOINTMENT (OUTPATIENT)
Dept: PHYSICAL THERAPY | Facility: CLINIC | Age: 83
End: 2023-12-29
Payer: MEDICARE

## 2024-01-02 ENCOUNTER — OFFICE VISIT (OUTPATIENT)
Dept: PHYSICAL THERAPY | Facility: CLINIC | Age: 84
End: 2024-01-02
Payer: MEDICARE

## 2024-01-02 DIAGNOSIS — M17.11 PRIMARY OSTEOARTHRITIS OF RIGHT KNEE: Primary | ICD-10-CM

## 2024-01-02 DIAGNOSIS — Z96.651 STATUS POST TOTAL RIGHT KNEE REPLACEMENT: ICD-10-CM

## 2024-01-02 PROCEDURE — 97110 THERAPEUTIC EXERCISES: CPT

## 2024-01-02 PROCEDURE — 97140 MANUAL THERAPY 1/> REGIONS: CPT

## 2024-01-02 NOTE — PROGRESS NOTES
"Daily Note     Today's date: 2024  Patient name: Rian Galeana  : 1940  MRN: [id number]  Referring provider: Lynn Bhatt MD  Dx:   Encounter Diagnosis     ICD-10-CM    1. Primary osteoarthritis of right knee  M17.11       2. Status post total right knee replacement  Z96.651           Start Time: 0800  Stop Time: 0846  Total time in clinic (min): 46 minutes    Subjective: Pt reports he'll feel ready for discharge after this week.      Objective: See treatment diary below      Assessment: Tolerated treatment well. Patient demonstrated fatigue post treatment, exhibited good technique with therapeutic exercises, and would benefit from continued PT. Pt a bit more fatigued with leg raises due to short layoff from therapy so focused session more on knee stretching and ROM. Pt did well with this, demonstrates good ROM of R knee in flexion and extension.      Plan: Progress treatment as tolerated.       Precautions: R TKA     POC expires MC 30 Days PT/OT + Visit Limit?    BOMN                  Manuals  RE    PROM R knee flex             R knee extension stretch             Patella mobs R knee             Hamstring stretching 6x15\" 6x15\"   5x20\" 6x15\" 6x15\"  NV 6x15\"   IASTM R ITB 4' c roller 4' c roller   4' c roller 3' c roller 3' c roller 5' c roller 4' c roller 4' c roller   Neuro Re-Ed             Pt education              SLR 3x15 2# 3x15 2#   3x12 3x10-15 3x10 1# 2x15 1# 2x12-1# 3x10 2#   Standing TKE 2x10 5\" grn            Bridges             RDLs             Prone hip extension 3x10 2# 3x15 2#   2x15 2x15 3x10 1# 2x12 1# 2x12 1# 3x10 2#   Sled push for TKE     2 laps 20# 2 laps 30# 2 laps 30# 2 laps 30# NP    Ther Ex             Upright bike for ROM 10' lvl 3 10' lvl 3   10' 10' 10' lvl 1-3 10' lvl 3 10' lvl 3 10' lvl 3   SL leg press 3x10-15 85-95# seat 8 3x10-15 -95# seat 8   75# 3x12 75# 4x12 3x12 75-85# 3x12 75-85# 3x12 85# seat 8 " "3x10-15 85-95# seat 8   Minisquats             Knee flex stretch on step  10x10\"           Sciatic nerve glides             Prone quad stretch 20\"x5 20\"x`0   20\"x5 20\"x5 20\"x5 20\"x5 20\"x5 20\"x5   S/L hip abd 3x10-15 2# 3x15 2#   2x15 3x12 2x10 1# 2x10 1# 2x12 +5 1# 3x10 2#   Cl[employer]hell     2x15 blue 3x12 blue 3x12 blue  3x15 blue    Ther Activity             Fwd step downs  1x10 8\"       2x7 8\"    Lat step downs             Sit to stands c airex 2x10-12 no airex    2x12 2x15 2x15 2x15 2x10 no airex NP   Stairs negotiation             Gait Training                                       Modalities                                                      "

## 2024-01-04 ENCOUNTER — OFFICE VISIT (OUTPATIENT)
Dept: PHYSICAL THERAPY | Facility: CLINIC | Age: 84
End: 2024-01-04
Payer: MEDICARE

## 2024-01-04 DIAGNOSIS — M17.11 PRIMARY OSTEOARTHRITIS OF RIGHT KNEE: Primary | ICD-10-CM

## 2024-01-04 DIAGNOSIS — Z96.651 STATUS POST TOTAL RIGHT KNEE REPLACEMENT: ICD-10-CM

## 2024-01-04 PROCEDURE — 97140 MANUAL THERAPY 1/> REGIONS: CPT

## 2024-01-04 PROCEDURE — 97112 NEUROMUSCULAR REEDUCATION: CPT

## 2024-01-04 PROCEDURE — 97110 THERAPEUTIC EXERCISES: CPT

## 2024-01-04 NOTE — PROGRESS NOTES
"Daily Note     Today's date: 2024  Patient name: Rian Galeana  : 1940  MRN: [id number]  Referring provider: Lynn Bhatt MD  Dx:   Encounter Diagnosis     ICD-10-CM    1. Primary osteoarthritis of right knee  M17.11       2. Status post total right knee replacement  Z96.651           Start Time: 0800  Stop Time: 0845  Total time in clinic (min): 45 minutes    Subjective: Pt reports his knee feels good.      Objective: See treatment diary below      Assessment: Tolerated treatment well. Patient demonstrated fatigue post treatment and exhibited good technique with therapeutic exercises. Pt is agreeable and appropriate for physical therapy discharge due to achieving his functional goals with physical therapy. Pt's FOTO score improved significantly showing his improved function. Reviewed HEP and exercises for his L knee prior to upcoming surgery.      Plan:  DC with HEP     Precautions: R TKA     POC expires MC 30 Days PT/OT + Visit Limit?    BOMN                  Manuals  RE    PROM R knee flex             R knee extension stretch             Patella mobs R knee             Hamstring stretching 6x15\" 6x15\" 6x15\"  5x20\" 6x15\" 6x15\"  NV 6x15\"   IASTM R ITB 4' c roller 4' c roller 5' c roller  4' c roller 3' c roller 3' c roller 5' c roller 4' c roller 4' c roller   Neuro Re-Ed             Pt education    6'          SLR 3x15 2# 3x15 2# 3x15 2#  3x12 3x10-15 3x10 1# 2x15 1# 2x12-1# 3x10 2#   Standing TKE 2x10 5\" grn            Bridges             RDLs             Prone hip extension 3x10 2# 3x15 2#   2x15 2x15 3x10 1# 2x12 1# 2x12 1# 3x10 2#   Sled push for TKE     2 laps 20# 2 laps 30# 2 laps 30# 2 laps 30# NP    Ther Ex             Upright bike for ROM 10' lvl 3 10' lvl 3 10' lvl 4  10' 10' 10' lvl 1-3 10' lvl 3 10' lvl 3 10' lvl 3   SL leg press 3x10-15 85-95# seat 8 3x10-15 85-95# seat 8   75# 3x12 75# 4x12 3x12 75-85# 3x12 75-85# 3x12 85# " "seat 8 3x10-15 85-95# seat 8   Minisquats             Knee flex stretch on step  10x10\"           Sciatic nerve glides             Prone quad stretch 20\"x5 20\"x`0 20\"x5  20\"x5 20\"x5 20\"x5 20\"x5 20\"x5 20\"x5   S/L hip abd 3x10-15 2# 3x15 2#   2x15 3x12 2x10 1# 2x10 1# 2x12 +5 1# 3x10 2#   Clamshell     2x15 blue 3x12 blue 3x12 blue  3x15 blue    Ther Activity             Fwd step downs  1x10 8\"       2x7 8\"    Lat step downs             Sit to stands c airex 2x10-12 no airex    2x12 2x15 2x15 2x15 2x10 no airex NP   Stairs negotiation             Gait Training                                       Modalities                                                        "

## 2024-04-05 NOTE — PROGRESS NOTES
PT Re-Evaluation     Today's date: 2024  Patient name: Rian Galeana  : 1940  MRN: [id number]  Referring provider: Lynn Bhatt MD  Dx:   Encounter Diagnosis     ICD-10-CM    1. S/P total knee arthroplasty, left  Z96.652             Start Time: 08  Stop Time: 931  Total time in clinic (min): 45 minutes    Assessment  Assessment details: Pt is a 83 y.o. male presenting to physical therapy with s/p L TKA on 3/18/2024. Pt presents with decreased ROM of L knee and decreased strength of LLE that is resulting in decreased functional mobility. Pt currently limited ambulation, stairs, recreation, and household activities. Pt also appears to have component of hamstring tendinopathy/muscle irritation. Sx were improved at end of session after soft tissue massage and isometrics, assess response next visit. Pt is a good candidate for skilled PT to address impairments and facilitate return to prior level of function.    Impairments: abnormal gait, abnormal or restricted ROM, activity intolerance, impaired balance, impaired physical strength and pain with function    Symptom irritability: low  Goals  STG 4 - weeks  1. Patient will demonstrate improved L knee extension ROM to 0 degrees to facilitate functional ability.  2. Patient will demonstrate improved L knee flexion strength to 4-/5 or better to facilitate functional ability.  LTG 8 - weeks  1. Patient will demonstrate ability to ambulate 250' with good mechanics and 1/10 pain or less.  2. Patient will demonstrate increased FOTO score by 10 points or more from baseline.    Plan  Patient would benefit from: PT eval and skilled physical therapy  Planned modality interventions: cryotherapy, thermotherapy: hydrocollator packs and unattended electrical stimulation  Planned therapy interventions: ADL retraining, flexibility, functional ROM exercises, home exercise program, joint mobilization, manual therapy, massage, patient education, strengthening,  stretching, therapeutic activities, therapeutic exercise, neuromuscular re-education, gait training and balance/weight bearing training  Frequency: 3x week  Duration in weeks: 8  Plan of Care beginning date: 2024  Plan of Care expiration date: 6/3/2024  Treatment plan discussed with: patient        Subjective Evaluation    History of Present Illness  Mechanism of injury: Pt presents s/p L knee replacement on 3/18/24. Pt had his R knee replaced on 24 and completed outpatient therapy for this. Pt reports he went home the same day after operation. Pt then underwent home health therapy 3x/wk for two weeks. Pt was ambulating with rolling walker initially for first few days and then transitioned to single point cane. Pt reports swelling is persisting, very swollen near knee and in calf/foot, but he feels it is less than it was in the R leg. Pt reports getting some tendon-pain since yesterday near his lateral hamstring. Pt feels the pain occasionally with range of motion of the knee, as well as picking his leg up while walking, not too much pain at rest. Pt has been doing home exercise program consistently, doing much better with squatting and extended knee.  Patient Goals  Patient goals for therapy: decreased pain, decreased edema, increased motion, increased strength and independence with ADLs/IADLs  Patient goal: Walk long distances, uneven ground, no device. Normal stairs negotiation  Pain  Current pain ratin  At best pain ratin  At worst pain ratin  Location: L lateral hamstring  Quality: dull ache and discomfort  Relieving factors: medications  Aggravating factors: stair climbing and walking  Progression: improved          Objective     Observations   Left Knee   Positive for edema.     Active Range of Motion   Left Knee   Flexion: 117 degrees   Extension: -1 degrees   Extensor la degrees     Right Knee   Flexion: 128 degrees   Extension: 0 degrees   Extensor la degrees     Mobility  "  Patellar Mobility:   Left Knee   Hypomobile: left superior and left inferior    Strength/Myotome Testing     Left Hip   Planes of Motion   Flexion: 3+  Extension: 3+  Abduction: 3+    Right Hip   Planes of Motion   Flexion: 5  Extension: 4+  Abduction: 4+    Left Knee   Flexion: 3  Extension: 4    Right Knee   Flexion: 4+  Extension: 4+    Ambulation     Observational Gait   Gait: antalgic   Decreased walking speed and stride length.     Additional Observational Gait Details  SPC RUE             Precautions: R TKA 9/9; L TKA    POC expires MC 30 Days PT/OT + Visit Limit?   6/3 5/8; 10 visits BOMN            Visit/Unit Tracking  AUTH Status:  Date 4/8              10 visits to re-eval Used 1               Remaining  9                      Manuals 4/8            PROM R knee flex             L knee extension stretch 5x10\"            Patella mobs L knee 5'            Hamstring stretching             IASTM L lateral hamstring tendon 3'            Neuro Re-Ed             Pt education about tendinopathy, POC, HEP 10'            SLR             Quad sets             Bridges             RDLs                                       Ther Ex             HS sets c small bolster 2x5 5\" 10\"           SL leg press             Minisquats             Knee flex stretch on step             Sciatic nerve glides             Prone quad stretch                          Upright bike for ROM             Ther Activity             Fwd step downs             Lat step downs             Sit to stands c airex             Stairs negotiation             Gait Training                                       Modalities                                                "

## 2024-04-08 ENCOUNTER — EVALUATION (OUTPATIENT)
Dept: PHYSICAL THERAPY | Facility: CLINIC | Age: 84
End: 2024-04-08
Payer: MEDICARE

## 2024-04-08 DIAGNOSIS — Z96.652 S/P TOTAL KNEE ARTHROPLASTY, LEFT: Primary | ICD-10-CM

## 2024-04-08 PROCEDURE — 97140 MANUAL THERAPY 1/> REGIONS: CPT

## 2024-04-08 PROCEDURE — 97112 NEUROMUSCULAR REEDUCATION: CPT

## 2024-04-08 PROCEDURE — 97161 PT EVAL LOW COMPLEX 20 MIN: CPT

## 2024-04-08 NOTE — LETTER
2024    Lynn Bhatt MD  [address] [facility] [address]    Patient: Rian Galeana   YOB: 1940   Date of Visit: 2024     Encounter Diagnosis     ICD-10-CM    1. S/P total knee arthroplasty, left  Z96.652           Dear Dr. Bhatt:    Thank you for your recent referral of Rian Galeana. Please review the attached evaluation summary from Rian's recent visit.     Please verify that you agree with the plan of care by signing the attached order.     If you have any questions or concerns, please do not hesitate to call.     I sincerely appreciate the opportunity to share in the care of one of your patients and hope to have another opportunity to work with you in the near future.       Sincerely,    Branden Hatch, PT      Referring Provider:      I certify that I have read the below Plan of Care and certify the need for these services furnished under this plan of treatment while under my care.                    Lynn Bhatt MD  [address] [facility] [address]  Via Fax: [phone]          PT Re-Evaluation     Today's date: 2024  Patient name: Rian Galeana  : 1940  MRN: [id number]  Referring provider: Lynn Bhatt MD  Dx:   Encounter Diagnosis     ICD-10-CM    1. S/P total knee arthroplasty, left  Z96.652             Start Time: 0846  Stop Time: 931  Total time in clinic (min): 45 minutes    Assessment  Assessment details: Pt is a 83 y.o. male presenting to physical therapy with s/p L TKA on 3/18/2024. Pt presents with decreased ROM of L knee and decreased strength of LLE that is resulting in decreased functional mobility. Pt currently limited ambulation, stairs, recreation, and household activities. Pt also appears to have component of hamstring tendinopathy/muscle irritation. Sx were improved at end of session after soft tissue massage and isometrics, assess response next visit. Pt is a good candidate for skilled PT to address impairments and  facilitate return to prior level of function.    Impairments: abnormal gait, abnormal or restricted ROM, activity intolerance, impaired balance, impaired physical strength and pain with function    Symptom irritability: low  Goals  STG 4 - weeks  1. Patient will demonstrate improved L knee extension ROM to 0 degrees to facilitate functional ability.  2. Patient will demonstrate improved L knee flexion strength to 4-/5 or better to facilitate functional ability.  LTG 8 - weeks  1. Patient will demonstrate ability to ambulate 250' with good mechanics and 1/10 pain or less.  2. Patient will demonstrate increased FOTO score by 10 points or more from baseline.    Plan  Patient would benefit from: PT eval and skilled physical therapy  Planned modality interventions: cryotherapy, thermotherapy: hydrocollator packs and unattended electrical stimulation  Planned therapy interventions: ADL retraining, flexibility, functional ROM exercises, home exercise program, joint mobilization, manual therapy, massage, patient education, strengthening, stretching, therapeutic activities, therapeutic exercise, neuromuscular re-education, gait training and balance/weight bearing training  Frequency: 3x week  Duration in weeks: 8  Plan of Care beginning date: 4/8/2024  Plan of Care expiration date: 6/3/2024  Treatment plan discussed with: patient        Subjective Evaluation    History of Present Illness  Mechanism of injury: Pt presents s/p L knee replacement on 3/18/24. Pt had his R knee replaced on 9/9/24 and completed outpatient therapy for this. Pt reports he went home the same day after operation. Pt then underwent home health therapy 3x/wk for two weeks. Pt was ambulating with rolling walker initially for first few days and then transitioned to single point cane. Pt reports swelling is persisting, very swollen near knee and in calf/foot, but he feels it is less than it was in the R leg. Pt reports getting some tendon-pain since  "yesterday near his lateral hamstring. Pt feels the pain occasionally with range of motion of the knee, as well as picking his leg up while walking, not too much pain at rest. Pt has been doing home exercise program consistently, doing much better with squatting and extended knee.  Patient Goals  Patient goals for therapy: decreased pain, decreased edema, increased motion, increased strength and independence with ADLs/IADLs  Patient goal: Walk long distances, uneven ground, no device. Normal stairs negotiation  Pain  Current pain ratin  At best pain ratin  At worst pain ratin  Location: L lateral hamstring  Quality: dull ache and discomfort  Relieving factors: medications  Aggravating factors: stair climbing and walking  Progression: improved          Objective     Observations   Left Knee   Positive for edema.     Active Range of Motion   Left Knee   Flexion: 117 degrees   Extension: -1 degrees   Extensor la degrees     Right Knee   Flexion: 128 degrees   Extension: 0 degrees   Extensor la degrees     Mobility   Patellar Mobility:   Left Knee   Hypomobile: left superior and left inferior    Strength/Myotome Testing     Left Hip   Planes of Motion   Flexion: 3+  Extension: 3+  Abduction: 3+    Right Hip   Planes of Motion   Flexion: 5  Extension: 4+  Abduction: 4+    Left Knee   Flexion: 3  Extension: 4    Right Knee   Flexion: 4+  Extension: 4+    Ambulation     Observational Gait   Gait: antalgic   Decreased walking speed and stride length.     Additional Observational Gait Details  SPC RUE             Precautions: R TKA ; L TKA    POC expires MC 30 Days PT/OT + Visit Limit?   6/3 5/8; 10 visits BOMN            Visit/Unit Tracking  AUTH Status:  Date               10 visits to re-eval Used 1               Remaining  9                      Manuals             PROM R knee flex             L knee extension stretch 5x10\"            Patella mobs L knee 5'            Hamstring stretching      " "       IASTM L lateral hamstring tendon 3'            Neuro Re-Ed             Pt education about tendinopathy, POC, HEP 10'            SLR             Quad sets             Bridges             RDLs                                       Ther Ex             HS sets c small bolster 2x5 5\" 10\"           SL leg press             Minisquats             Knee flex stretch on step             Sciatic nerve glides             Prone quad stretch                          Upright bike for ROM             Ther Activity             Fwd step downs             Lat step downs             Sit to stands c airex             Stairs negotiation             Gait Training                                       Modalities                                                                "

## 2024-04-10 ENCOUNTER — OFFICE VISIT (OUTPATIENT)
Dept: PHYSICAL THERAPY | Facility: CLINIC | Age: 84
End: 2024-04-10
Payer: MEDICARE

## 2024-04-10 DIAGNOSIS — Z96.652 S/P TOTAL KNEE ARTHROPLASTY, LEFT: Primary | ICD-10-CM

## 2024-04-10 PROCEDURE — 97110 THERAPEUTIC EXERCISES: CPT

## 2024-04-10 PROCEDURE — 97112 NEUROMUSCULAR REEDUCATION: CPT

## 2024-04-10 PROCEDURE — 97140 MANUAL THERAPY 1/> REGIONS: CPT

## 2024-04-10 NOTE — PROGRESS NOTES
"Daily Note     Today's date: 4/10/2024  Patient name: Rian Galeana  : 1940  MRN: [id number]  Referring provider: Lynn Bhatt MD  Dx:   Encounter Diagnosis     ICD-10-CM    1. S/P total knee arthroplasty, left  Z96.652           Start Time: 845  Stop Time: 928  Total time in clinic (min): 43 minutes    Subjective: Pt reports he had been feeling much better since first visit but coming to this visit he twisted his knee a bit and the hamstring is re-aggravated.      Objective: See treatment diary below      Assessment: Tolerated treatment well. Patient demonstrated fatigue post treatment, exhibited good technique with therapeutic exercises, and would benefit from continued PT. Continued tenderness along lateral hamstring tendon, but discomfort with ambulation improved after manual techniques and isometrics. Pt ambulating better at end of session. Assess response next visit.      Plan: Continue per plan of care.  Progress treatment as tolerated.       Precautions: L TKA 3/18    POC expires MC 30 Days PT/OT + Visit Limit?   6/3 5/8; 10 visits BOMN            Visit/Unit Tracking  AUTH Status:  Date 4/8 4/10             10 visits to re-eval Used 1 2              Remaining  9 8                     Manuals 4/8 4/10           L knee extension stretch 5x10\" 5x10\"           Patella mobs L knee 5' 3'           Hamstring stretching             IASTM L lateral hamstring tendon 3' 5'           Neuro Re-Ed             Pt education about tendinopathy, POC, HEP 10' 5'           SLR  2x10           Quad sets             Bridges             RDLs                                       Ther Ex             HS sets c small bolster 2x5 5\" 15x10\"           SL leg press  3x10 35# seat 11           Minisquats  1x10           Knee flex stretch on step             LAQs  2x10           Prone quad stretch                          Upright bike for ROM  6' seat 12           Ther Activity             Fwd step downs             Lat " step downs             Sit to stands c airex             Stairs negotiation             Gait Training                                       Modalities

## 2024-04-15 ENCOUNTER — OFFICE VISIT (OUTPATIENT)
Dept: PHYSICAL THERAPY | Facility: CLINIC | Age: 84
End: 2024-04-15
Payer: MEDICARE

## 2024-04-15 DIAGNOSIS — Z96.652 S/P TOTAL KNEE ARTHROPLASTY, LEFT: Primary | ICD-10-CM

## 2024-04-15 PROCEDURE — 97110 THERAPEUTIC EXERCISES: CPT

## 2024-04-15 PROCEDURE — 97140 MANUAL THERAPY 1/> REGIONS: CPT

## 2024-04-15 PROCEDURE — 97112 NEUROMUSCULAR REEDUCATION: CPT

## 2024-04-15 NOTE — PROGRESS NOTES
"Daily Note     Today's date: 4/15/2024  Patient name: Rian Galeana  : 1940  MRN: [id number]  Referring provider: Lynn Bhatt MD  Dx:   Encounter Diagnosis     ICD-10-CM    1. S/P total knee arthroplasty, left  Z96.652           Start Time: 0845  Stop Time: 0930  Total time in clinic (min): 45 minutes    Subjective: Pt reports he is still having some tenderness in his hamstring area but notes he is having an easier time going up the stairs.      Objective: See treatment diary below      Assessment: Tolerated treatment well. Patient demonstrated fatigue post treatment, exhibited good technique with therapeutic exercises, and would benefit from continued PT. Pt continues to work well through program with decreased hamstring tenderness following manual interventions. He was most challenged by step downs due to fatigue but recovered well with rest breaks between sets.       Plan: Continue per plan of care.      Precautions: L TKA 3/18    POC expires MC 30 Days PT/OT + Visit Limit?   6/3 5/8; 10 visits BOMN            Visit/Unit Tracking  AUTH Status:  Date 4/8 4/10 4/15            10 visits to re-eval Used 1 2 3             Remaining  9 8 7                    Manuals 4/8 4/10 4/15          L knee extension stretch 5x10\" 5x10\" CG          Patella mobs L knee 5' 3' CG          Hamstring stretching             IASTM L lateral hamstring tendon 3' 5' CG          Neuro Re-Ed             Pt education about tendinopathy, POC, HEP 10' 5'           SLR  2x10 2x10          Quad sets             Bridges   2x10          RDLs                                       Ther Ex             HS sets c small bolster 2x5 5\" 15x10\" 15x10\"          SL leg press  3x10 35# seat 11 3x10 35# seat 11          Minisquats  1x10 1x15          Knee flex stretch on step             LAQs  2x10 2x10          Prone quad stretch                          Upright bike for ROM  6' seat 12 6' seat 12          Ther Activity             Fwd step " "downs   6\" 1x10          Lat step downs   6\" 2x10          Sit to stands c airex   2x10          Stairs negotiation             Gait Training                                       Modalities                                                    "

## 2024-04-17 ENCOUNTER — OFFICE VISIT (OUTPATIENT)
Dept: PHYSICAL THERAPY | Facility: CLINIC | Age: 84
End: 2024-04-17
Payer: MEDICARE

## 2024-04-17 DIAGNOSIS — Z96.652 S/P TOTAL KNEE ARTHROPLASTY, LEFT: Primary | ICD-10-CM

## 2024-04-17 PROCEDURE — 97112 NEUROMUSCULAR REEDUCATION: CPT

## 2024-04-17 PROCEDURE — 97140 MANUAL THERAPY 1/> REGIONS: CPT

## 2024-04-17 PROCEDURE — 97110 THERAPEUTIC EXERCISES: CPT

## 2024-04-19 ENCOUNTER — OFFICE VISIT (OUTPATIENT)
Dept: PHYSICAL THERAPY | Facility: CLINIC | Age: 84
End: 2024-04-19
Payer: MEDICARE

## 2024-04-19 DIAGNOSIS — Z96.652 S/P TOTAL KNEE ARTHROPLASTY, LEFT: Primary | ICD-10-CM

## 2024-04-19 PROCEDURE — 97140 MANUAL THERAPY 1/> REGIONS: CPT

## 2024-04-19 PROCEDURE — 97110 THERAPEUTIC EXERCISES: CPT

## 2024-04-19 PROCEDURE — 97112 NEUROMUSCULAR REEDUCATION: CPT

## 2024-04-19 NOTE — PROGRESS NOTES
"Daily Note     Today's date: 2024  Patient name: Rian Galeana  : 1940  MRN: [id number]  Referring provider: Lynn Bhatt MD  Dx:   Encounter Diagnosis     ICD-10-CM    1. S/P total knee arthroplasty, left  Z96.652           Start Time: 0845  Stop Time: 09  Total time in clinic (min): 45 minutes    Subjective: Pt reports no sharp aches/pains since previous visit.      Objective: See treatment diary below      Assessment: Tolerated treatment well. Patient demonstrated fatigue post treatment, exhibited good technique with therapeutic exercises, and would benefit from continued PT. Pt demonstrating improvement and able to re-initiate more closed chain strengthening. Pt did well with exercises this session, able to progress leg press resistance and ROM. Continue with gradual progressions as tolerated.      Plan: Continue per plan of care.  Progress treatment as tolerated.       Precautions: L TKA 3/18    POC expires MC 30 Days PT/OT + Visit Limit?   6/3 5/8; 10 visits BOMN            Visit/Unit Tracking  AUTH Status:  Date 4/8 4/10 4/15 4/17 4/19          10 visits to re-eval Used 1 2 3 4 5           Remaining  9 8 7 6 5                  Manuals 4/8 4/10 4/15 4/17 4/19 FOTO       L knee extension stretch 5x10\" 5x10\" CG 15x10\" 10x10\"        Patella mobs L knee 5' 3' CG 3'         Hamstring stretching             IASTM L lateral hamstring tendon 3' 5' CG 5' 5'        Neuro Re-Ed             Pt education  10' 5'   3'        SLR  2x10 2x10 2x10 2x10        Quad sets             Bridges   2x10 2x10         RDLs             S/L hip abd    1x15                      Ther Ex             HS sets c small bolster 2x5 5\" 15x10\" 15x10\" 15x10\" 15x10\"        SL leg press  3x10 35# seat 11 3x10 35# seat 11 3x10 45# seat 11 2x10 45#; 1x10 55# Seat 10        Minisquats  1x10 1x15 2x12         Knee flex stretch on step             LAQs  2x10 2x10 2x10 2x10        Prone quad stretch    NV         Prone hip ext    NV  " "       Upright bike for ROM  6' seat 12 6' seat 12 10' seat 13 10' seat 11        Ther Activity             Fwd step downs   6\" 1x10  2x10 4\"        Lat step downs   6\" 2x10 NV         Sit to stands c airex   2x10 NV 2x8        Stairs negotiation             Gait Training                                       Modalities                                                        "

## 2024-04-22 ENCOUNTER — OFFICE VISIT (OUTPATIENT)
Dept: PHYSICAL THERAPY | Facility: CLINIC | Age: 84
End: 2024-04-22
Payer: MEDICARE

## 2024-04-22 DIAGNOSIS — Z96.652 S/P TOTAL KNEE ARTHROPLASTY, LEFT: Primary | ICD-10-CM

## 2024-04-22 PROCEDURE — 97140 MANUAL THERAPY 1/> REGIONS: CPT

## 2024-04-22 PROCEDURE — 97112 NEUROMUSCULAR REEDUCATION: CPT

## 2024-04-22 PROCEDURE — 97110 THERAPEUTIC EXERCISES: CPT

## 2024-04-22 NOTE — PROGRESS NOTES
"Daily Note     Today's date: 2024  Patient name: Rian Galeana  : 1940  MRN: [id number]  Referring provider: Lynn Bhatt MD  Dx:   Encounter Diagnosis     ICD-10-CM    1. S/P total knee arthroplasty, left  Z96.652           Start Time: 08  Stop Time: 929  Total time in clinic (min): 44 minutes    Subjective: Pt reports having less pains in the back/side of the knee.      Objective: See treatment diary below      Assessment: Tolerated treatment well. Patient demonstrated fatigue post treatment, exhibited good technique with therapeutic exercises, and would benefit from continued PT. Some progressions made of resistance this session. Initiated prone quad stretching and hip extension, pt tolerated these well and had fatigue after performance.      Plan: Continue per plan of care.  Progress treatment as tolerated.       Precautions: L TKA 3/18    POC expires MC 30 Days PT/OT + Visit Limit?   6/3 5/8; 10 visits BOMN            Visit/Unit Tracking  AUTH Status:  Date 4/8 4/10 4/15 4/17 4/19 4/22         10 visits to re-eval Used 1 2 3 4 5 6          Remaining  9 8 7 6 5 4                 Manuals 4/8 4/10 4/15 4/17 4/19 4/22 DO FOTO      L knee extension stretch 5x10\" 5x10\" CG 15x10\" 10x10\" 10x10\"       Patella mobs L knee 5' 3' CG 3'         Hamstring stretching             IASTM L lateral hamstring tendon 3' 5' CG 5' 5' 5'       Neuro Re-Ed             Pt education  10' 5'   3' 3'       SLR  2x10 2x10 2x10 2x10 2x12       Quad sets             Bridges   2x10 2x10         RDLs             S/L hip abd    1x15                      Ther Ex             HS sets c small bolster 2x5 5\" 15x10\" 15x10\" 15x10\" 15x10\" 15x10\" large bolster       SL leg press  3x10 35# seat 11 3x10 35# seat 11 3x10 45# seat 11 2x10 45#; 1x10 55# Seat 10 1x10 45#; 2x10 55# Seat 10       Minisquats  1x10 1x15 2x12         Knee flex stretch on step             LAQs  2x10 2x10 2x10 2x10        Prone quad stretch    NV  5x20\"     " "  Prone hip ext    NV  X10, x5       Upright bike for ROM  6' seat 12 6' seat 12 10' seat 13 10' seat 11 10' seat 11       Ther Activity             Fwd step downs   6\" 1x10  2x10 4\" 2x15 6\"       Lat step downs   6\" 2x10 NV         Sit to stands c airex   2x10 NV 2x8 1x10; 1x12       Stairs negotiation             Gait Training                                       Modalities                                                          "

## 2024-04-24 ENCOUNTER — OFFICE VISIT (OUTPATIENT)
Dept: PHYSICAL THERAPY | Facility: CLINIC | Age: 84
End: 2024-04-24
Payer: MEDICARE

## 2024-04-24 DIAGNOSIS — Z96.652 S/P TOTAL KNEE ARTHROPLASTY, LEFT: Primary | ICD-10-CM

## 2024-04-24 PROCEDURE — 97140 MANUAL THERAPY 1/> REGIONS: CPT

## 2024-04-24 PROCEDURE — 97110 THERAPEUTIC EXERCISES: CPT

## 2024-04-24 PROCEDURE — 97112 NEUROMUSCULAR REEDUCATION: CPT

## 2024-04-24 NOTE — PROGRESS NOTES
"Daily Note     Today's date: 2024  Patient name: Rian Galeana  : 1940  MRN: [id number]  Referring provider: Lynn Bhatt MD  Dx:   Encounter Diagnosis     ICD-10-CM    1. S/P total knee arthroplasty, left  Z96.652           Start Time: 0845  Stop Time: 09  Total time in clinic (min): 45 minutes    Subjective: Pt reports his knee continues to improve and feel better with walking.      Objective: See treatment diary below      Assessment: Tolerated treatment well. Patient demonstrated fatigue post treatment, exhibited good technique with therapeutic exercises, and would benefit from continued PT. Pt continues to progress well, able to initiate resisted terminal knee extension exercise. Pt continues to feel stretch in posterior knee with going into terminal knee extension, continue with stretching this.      Plan: Continue per plan of care.  Progress treatment as tolerated.       Precautions: L TKA 3/18    POC expires MC 30 Days PT/OT + Visit Limit?   6/3 5/8; 10 visits BOMN            Visit/Unit Tracking  AUTH Status:  Date 4/8 4/10 4/15 4/17 4/19 4/22 4/24        10 visits to re-eval Used 1 2 3 4 5 6 7         Remaining  9 8 7 6 5 4 3                Manuals 4/8 4/10 4/15 4/17 4/19 4/22 4/24 FOTO      L knee extension stretch 5x10\" 5x10\" CG 15x10\" 10x10\" 10x10\" 10x10\"      Patella mobs L knee 5' 3' CG 3'         Hamstring stretching             IASTM L lateral hamstring tendon 3' 5' CG 5' 5' 5' 5'      Neuro Re-Ed             Pt education  10' 5'   3' 3'       SLR  2x10 2x10 2x10 2x10 2x12 2x12      Quad sets             Bridges   2x10 2x10         RDLs             S/L hip abd    1x15         Standing TKE       1x13 5\" grn      Ther Ex             HS sets c small bolster 2x5 5\" 15x10\" 15x10\" 15x10\" 15x10\" 15x10\" large bolster       SL leg press  3x10 35# seat 11 3x10 35# seat 11 3x10 45# seat 11 2x10 45#; 1x10 55# Seat 10 1x10 45#; 2x10 55# Seat 10 2x15 55#; 1x15 65# Seat 10      Minisquats  " "1x10 1x15 2x12         Knee flex stretch on step             LAQs  2x10 2x10 2x10 2x10  2x10      Prone quad stretch    NV  5x20\" 5x20\"      Prone hip ext    NV  X10, x5 2x10      Upright bike for ROM  6' seat 12 6' seat 12 10' seat 13 10' seat 11 10' seat 11 10' seat 11 L3      Ther Activity             Fwd step downs   6\" 1x10  2x10 4\" 2x15 6\"       Lat step downs   6\" 2x10 NV         Sit to stands c airex   2x10 NV 2x8 1x10; 1x12 1x10; 1x12      Stairs negotiation             Gait Training                                       Modalities                                                            "

## 2024-04-26 ENCOUNTER — OFFICE VISIT (OUTPATIENT)
Dept: PHYSICAL THERAPY | Facility: CLINIC | Age: 84
End: 2024-04-26
Payer: MEDICARE

## 2024-04-26 DIAGNOSIS — Z96.652 S/P TOTAL KNEE ARTHROPLASTY, LEFT: Primary | ICD-10-CM

## 2024-04-26 PROCEDURE — 97110 THERAPEUTIC EXERCISES: CPT

## 2024-04-26 PROCEDURE — 97140 MANUAL THERAPY 1/> REGIONS: CPT

## 2024-04-26 PROCEDURE — 97112 NEUROMUSCULAR REEDUCATION: CPT

## 2024-04-26 NOTE — PROGRESS NOTES
"Daily Note     Today's date: 2024  Patient name: Rian Galeana  : 1940  MRN: [id number]  Referring provider: Lynn Bhatt MD  Dx:   Encounter Diagnosis     ICD-10-CM    1. S/P total knee arthroplasty, left  Z96.652                      Subjective: Pt reports his doctors visit went very well, his progress thus far has been great.       Objective: See treatment diary below      Assessment: Tolerated treatment well. Pt demonstrates good effort throughout session. Minimal curing given to facilitate proper exercise performance and technique. He tolerates increased reps for STS and increased weight for leg press today. He reports no increase in pain with exercises performed. Patient demonstrated fatigue post treatment, exhibited good technique with therapeutic exercises, and would benefit from continued PT      Plan: Continue per plan of care.      Precautions: L TKA 3/18    POC expires MC 30 Days PT/OT + Visit Limit?   6/3 5/8; 10 visits BOMN            Visit/Unit Tracking  AUTH Status:  Date 4/8 4/10 4/15 4/17 4/19 4/22 4/24 4/26       10 visits to re-eval Used 1 2 3 4 5 6 7 8        Remaining  9 8 7 6 5 4 3 2               Manuals 4/8 4/10 4/15 4/17 4/19 4/22 4/24 FOTO      L knee extension stretch 5x10\" 5x10\" CG 15x10\" 10x10\" 10x10\" 10x10\" 10x10\"     Patella mobs L knee 5' 3' CG 3'         Hamstring stretching             IASTM L lateral hamstring tendon 3' 5' CG 5' 5' 5' 5' 5'     Neuro Re-Ed             Pt education  10' 5'   3' 3'       SLR  2x10 2x10 2x10 2x10 2x12 2x12 2x12     Quad sets             Bridges   2x10 2x10         RDLs             S/L hip abd    1x15         Standing TKE       1x13 5\" grn 1x20  5\" grn     Ther Ex             HS sets c small bolster 2x5 5\" 15x10\" 15x10\" 15x10\" 15x10\" 15x10\" large bolster       SL leg press  3x10 35# seat 11 3x10 35# seat 11 3x10 45# seat 11 2x10 45#; 1x10 55# Seat 10 1x10 45#; 2x10 55# Seat 10 2x15 55#; 1x15 65# Seat 10 2x10  65#, 2x10 75# " "Seat 10     Minisquats  1x10 1x15 2x12         Knee flex stretch on step             LAQs  2x10 2x10 2x10 2x10  2x10 2x10     Prone quad stretch    NV  5x20\" 5x20\" 5x20\"     Prone hip ext    NV  X10, x5 2x10 2x10     Upright bike for ROM  6' seat 12 6' seat 12 10' seat 13 10' seat 11 10' seat 11 10' seat 11 L3 10' seat 11 L3     Ther Activity             Fwd step downs   6\" 1x10  2x10 4\" 2x15 6\"  2x10 6\"     Lat step downs   6\" 2x10 NV         Sit to stands c airex   2x10 NV 2x8 1x10; 1x12 1x10; 1x12 1x10; 1x15     Stairs negotiation             Gait Training                                       Modalities                                                              "

## 2024-04-29 ENCOUNTER — EVALUATION (OUTPATIENT)
Dept: PHYSICAL THERAPY | Facility: CLINIC | Age: 84
End: 2024-04-29
Payer: MEDICARE

## 2024-04-29 DIAGNOSIS — Z96.652 S/P TOTAL KNEE ARTHROPLASTY, LEFT: Primary | ICD-10-CM

## 2024-04-29 PROCEDURE — 97110 THERAPEUTIC EXERCISES: CPT

## 2024-04-29 PROCEDURE — 97112 NEUROMUSCULAR REEDUCATION: CPT

## 2024-04-29 PROCEDURE — 97140 MANUAL THERAPY 1/> REGIONS: CPT

## 2024-04-29 NOTE — PROGRESS NOTES
PT Re-Evaluation     Today's date: 2024  Patient name: Rian Galeana  : 1940  MRN: [id number]  Referring provider: Lynn Bhatt MD  Dx:   Encounter Diagnosis     ICD-10-CM    1. S/P total knee arthroplasty, left  Z96.652             Start Time: 0845  Stop Time: 0930  Total time in clinic (min): 45 minutes    Assessment  Assessment details: Pt is a 83 y.o. male presenting to physical therapy with s/p L TKA on 3/18/2024. Upon re-evaluation, pt demonstrates improvement of L knee ROM and L knee/hip strength. Pt still has some edema in LLE which appears to be limiting L knee flexion ROM. Continue to address impairments in order to facilitate improvement with functional limitations, which includes distance ambulation, stairs, and recreation. Pt is a good candidate for continued skilled PT to return him to prior level of function.    Impairments: abnormal gait, abnormal or restricted ROM, activity intolerance, impaired balance, impaired physical strength and pain with function  Functional limitations: distance ambulation, stairs, and recreation  Symptom irritability: low  Goals  STG 4 - weeks  1. Patient will demonstrate improved L knee extension ROM to 0 degrees to facilitate functional ability. -partially met  2. Patient will demonstrate improved L knee flexion strength to 4-/5 or better to facilitate functional ability. -met  LTG 8 - weeks  1. Patient will demonstrate ability to ambulate 250' with good mechanics and 1/10 pain or less. -met  2. Patient will demonstrate increased FOTO score by 10 points or more from baseline. -met    Plan  Patient would benefit from: PT eval and skilled physical therapy  Planned modality interventions: cryotherapy, thermotherapy: hydrocollator packs and unattended electrical stimulation  Planned therapy interventions: ADL retraining, flexibility, functional ROM exercises, home exercise program, joint mobilization, manual therapy, massage, patient education,  strengthening, stretching, therapeutic activities, therapeutic exercise, neuromuscular re-education, gait training and balance/weight bearing training  Frequency: 3x week  Duration in weeks: 8  Plan of Care beginning date: 2024  Plan of Care expiration date: 2024  Treatment plan discussed with: patient        Subjective Evaluation    History of Present Illness  Mechanism of injury: Pt presents s/p L knee replacement on 3/18/24. Pt had his R knee replaced on 24 and completed outpatient therapy for this. Pt reports he went home the same day after operation. Pt then underwent home health therapy 3x/wk for two weeks. Pt was ambulating with rolling walker initially for first few days and then transitioned to single point cane. Pt reports swelling is persisting, very swollen near knee and in calf/foot, but he feels it is less than it was in the R leg. Pt reports getting some tendon-pain since yesterday near his lateral hamstring. Pt feels the pain occasionally with range of motion of the knee, as well as picking his leg up while walking, not too much pain at rest. Pt has been doing home exercise program consistently, doing much better with squatting and extended knee.    Upon re-evaluation, pt reports he is making significant progress since starting physical therapy. Pt recently has stopped getting twinges of pain in his L hamstrings that he was getting before. Pt reports he has been more active and was able to do hike yesterday on uneven ground without too much soreness. Pt does still have swelling in and around knee and feels this restriction with bending his knee fully.  Patient Goals  Patient goals for therapy: decreased pain, decreased edema, increased motion, increased strength and independence with ADLs/IADLs  Patient goal: Walk long distances, uneven ground, no device. Normal stairs negotiation  Pain  Current pain ratin  At best pain ratin  At worst pain ratin  Location: L lateral  "hamstring  Quality: dull ache and discomfort  Relieving factors: medications  Aggravating factors: stair climbing and walking  Progression: improved          Objective     Observations   Left Knee   Positive for edema.     Active Range of Motion   Left Knee   Flexion: 122 degrees   Extension: -3 degrees   Extensor la degrees     Right Knee   Flexion: 128 degrees   Extension: 0 degrees   Extensor la degrees     Additional Active Range of Motion Details  L knee ext improved to 0 after manual stretching    Mobility   Patellar Mobility:   Left Knee   Hypomobile: left superior and left inferior    Strength/Myotome Testing     Left Hip   Planes of Motion   Flexion: 4-  Abduction: 4-    Right Hip   Planes of Motion   Flexion: 5  Extension: 4+  Abduction: 4+    Left Knee   Flexion: 4+  Extension: 4+    Right Knee   Flexion: 5  Extension: 5    Ambulation     Observational Gait   Gait: antalgic   Decreased walking speed and stride length.     Additional Observational Gait Details  No device             Precautions: R TKA ; L TKA 3/18    POC expires MC 30 Days PT/OT + Visit Limit?   6/3 5/8; 10 visits BOMN            Visit/Unit Tracking  AUTH Status:  Date 4/8 4/10 4/15 4/17 4/19 4/22 4/24 4/26 4/29      10 visits to re-eval Used 1 2 3 4 5 6 7 8 9       Remaining  9 8 7 6 5 4 3 2 1              Manuals 4/8 4/10 4/15 4/17 4/19 4/22 4/24 FOTO     L knee extension stretch 5x10\" 5x10\" CG 15x10\" 10x10\" 10x10\" 10x10\" 10x10\" 10x10\"    Patella mobs L knee 5' 3' CG 3'         Hamstring stretching             IASTM L lateral hamstring tendon 3' 5' CG 5' 5' 5' 5' 5' 5'    Neuro Re-Ed             Pt education about re-assessment 10' 5'   3' 3'   5'    SLR  2x10 2x10 2x10 2x10 2x12 2x12 2x12 2x15    Quad sets             Bridges   2x10 2x10         RDLs             S/L hip abd    1x15         Standing TKE       1x13 5\" grn 1x20  5\" grn 1x20  5\" grn    Ther Ex             HS sets c small bolster 2x5 5\" 15x10\" 15x10\" " "15x10\" 15x10\" 15x10\" large bolster       SL leg press  3x10 35# seat 11 3x10 35# seat 11 3x10 45# seat 11 2x10 45#; 1x10 55# Seat 10 1x10 45#; 2x10 55# Seat 10 2x15 55#; 1x15 65# Seat 10 2x10  65#, 2x10 75# Seat 10 2x10  65#, 2x10 75# Seat 10    Minisquats  1x10 1x15 2x12         Knee flex stretch on step             LAQs  2x10 2x10 2x10 2x10  2x10 2x10     Prone quad stretch    NV  5x20\" 5x20\" 5x20\" 5x20\"    Prone hip ext    NV  X10, x5 2x10 2x10 2x10    Upright bike for ROM  6' seat 12 6' seat 12 10' seat 13 10' seat 11 10' seat 11 10' seat 11 L3 10' seat 11 L3 10' seat 11 L3    Ther Activity             Fwd step downs   6\" 1x10  2x10 4\" 2x15 6\"  2x10 6\"     Lat step downs   6\" 2x10 NV         Sit to stands c airex   2x10 NV 2x8 1x10; 1x12 1x10; 1x12 1x10; 1x15     Stairs negotiation             Gait Training                                       Modalities                                                "

## 2024-04-29 NOTE — LETTER
May 3, 2024    Lynn Bhatt MD  [address] [facility] [address]    Patient: Rian Galeana   YOB: 1940   Date of Visit: 2024     Encounter Diagnosis     ICD-10-CM    1. S/P total knee arthroplasty, left  Z96.652           Dear Dr. Bhatt:    Thank you for your recent referral of Rian Galeana. Please review the attached evaluation summary from Rian's recent visit.     Please verify that you agree with the plan of care by signing the attached order.     If you have any questions or concerns, please do not hesitate to call.     I sincerely appreciate the opportunity to share in the care of one of your patients and hope to have another opportunity to work with you in the near future.       Sincerely,    Branden Hatch, PT      Referring Provider:      I certify that I have read the below Plan of Care and certify the need for these services furnished under this plan of treatment while under my care.                    Lynn Bhatt MD  [address] [facility] [address]  Via Fax: [phone]          PT Re-Evaluation     Today's date: 2024  Patient name: Rian Galeana  : 1940  MRN: [id number]  Referring provider: Lnyn Bhatt MD  Dx:   Encounter Diagnosis     ICD-10-CM    1. S/P total knee arthroplasty, left  Z96.652             Start Time: 0845  Stop Time: 0930  Total time in clinic (min): 45 minutes    Assessment  Assessment details: Pt is a 83 y.o. male presenting to physical therapy with s/p L TKA on 3/18/2024. Upon re-evaluation, pt demonstrates improvement of L knee ROM and L knee/hip strength. Pt still has some edema in LLE which appears to be limiting L knee flexion ROM. Continue to address impairments in order to facilitate improvement with functional limitations, which includes distance ambulation, stairs, and recreation. Pt is a good candidate for continued skilled PT to return him to prior level of function.    Impairments: abnormal gait,  abnormal or restricted ROM, activity intolerance, impaired balance, impaired physical strength and pain with function  Functional limitations: distance ambulation, stairs, and recreation  Symptom irritability: low  Goals  STG 4 - weeks  1. Patient will demonstrate improved L knee extension ROM to 0 degrees to facilitate functional ability. -partially met  2. Patient will demonstrate improved L knee flexion strength to 4-/5 or better to facilitate functional ability. -met  LTG 8 - weeks  1. Patient will demonstrate ability to ambulate 250' with good mechanics and 1/10 pain or less. -met  2. Patient will demonstrate increased FOTO score by 10 points or more from baseline. -met    Plan  Patient would benefit from: PT eval and skilled physical therapy  Planned modality interventions: cryotherapy, thermotherapy: hydrocollator packs and unattended electrical stimulation  Planned therapy interventions: ADL retraining, flexibility, functional ROM exercises, home exercise program, joint mobilization, manual therapy, massage, patient education, strengthening, stretching, therapeutic activities, therapeutic exercise, neuromuscular re-education, gait training and balance/weight bearing training  Frequency: 3x week  Duration in weeks: 8  Plan of Care beginning date: 4/29/2024  Plan of Care expiration date: 6/24/2024  Treatment plan discussed with: patient        Subjective Evaluation    History of Present Illness  Mechanism of injury: Pt presents s/p L knee replacement on 3/18/24. Pt had his R knee replaced on 9/9/24 and completed outpatient therapy for this. Pt reports he went home the same day after operation. Pt then underwent home health therapy 3x/wk for two weeks. Pt was ambulating with rolling walker initially for first few days and then transitioned to single point cane. Pt reports swelling is persisting, very swollen near knee and in calf/foot, but he feels it is less than it was in the R leg. Pt reports getting some  tendon-pain since yesterday near his lateral hamstring. Pt feels the pain occasionally with range of motion of the knee, as well as picking his leg up while walking, not too much pain at rest. Pt has been doing home exercise program consistently, doing much better with squatting and extended knee.    Upon re-evaluation, pt reports he is making significant progress since starting physical therapy. Pt recently has stopped getting twinges of pain in his L hamstrings that he was getting before. Pt reports he has been more active and was able to do hike yesterday on uneven ground without too much soreness. Pt does still have swelling in and around knee and feels this restriction with bending his knee fully.  Patient Goals  Patient goals for therapy: decreased pain, decreased edema, increased motion, increased strength and independence with ADLs/IADLs  Patient goal: Walk long distances, uneven ground, no device. Normal stairs negotiation  Pain  Current pain ratin  At best pain ratin  At worst pain ratin  Location: L lateral hamstring  Quality: dull ache and discomfort  Relieving factors: medications  Aggravating factors: stair climbing and walking  Progression: improved          Objective     Observations   Left Knee   Positive for edema.     Active Range of Motion   Left Knee   Flexion: 122 degrees   Extension: -3 degrees   Extensor la degrees     Right Knee   Flexion: 128 degrees   Extension: 0 degrees   Extensor la degrees     Additional Active Range of Motion Details  L knee ext improved to 0 after manual stretching    Mobility   Patellar Mobility:   Left Knee   Hypomobile: left superior and left inferior    Strength/Myotome Testing     Left Hip   Planes of Motion   Flexion: 4-  Abduction: 4-    Right Hip   Planes of Motion   Flexion: 5  Extension: 4+  Abduction: 4+    Left Knee   Flexion: 4+  Extension: 4+    Right Knee   Flexion: 5  Extension: 5    Ambulation     Observational Gait   Gait:  "antalgic   Decreased walking speed and stride length.     Additional Observational Gait Details  No device             Precautions: R TKA 9/9; L TKA 3/18    POC expires MC 30 Days PT/OT + Visit Limit?   6/3 5/8; 10 visits BOMN            Visit/Unit Tracking  AUTH Status:  Date 4/8 4/10 4/15 4/17 4/19 4/22 4/24 4/26 4/29      10 visits to re-eval Used 1 2 3 4 5 6 7 8 9       Remaining  9 8 7 6 5 4 3 2 1              Manuals 4/8 4/10 4/15 4/17 4/19 4/22 4/24 FOTO 4/26 4/29    L knee extension stretch 5x10\" 5x10\" CG 15x10\" 10x10\" 10x10\" 10x10\" 10x10\" 10x10\"    Patella mobs L knee 5' 3' CG 3'         Hamstring stretching             IASTM L lateral hamstring tendon 3' 5' CG 5' 5' 5' 5' 5' 5'    Neuro Re-Ed             Pt education about re-assessment 10' 5'   3' 3'   5'    SLR  2x10 2x10 2x10 2x10 2x12 2x12 2x12 2x15    Quad sets             Bridges   2x10 2x10         RDLs             S/L hip abd    1x15         Standing TKE       1x13 5\" grn 1x20  5\" grn 1x20  5\" grn    Ther Ex             HS sets c small bolster 2x5 5\" 15x10\" 15x10\" 15x10\" 15x10\" 15x10\" large bolster       SL leg press  3x10 35# seat 11 3x10 35# seat 11 3x10 45# seat 11 2x10 45#; 1x10 55# Seat 10 1x10 45#; 2x10 55# Seat 10 2x15 55#; 1x15 65# Seat 10 2x10  65#, 2x10 75# Seat 10 2x10  65#, 2x10 75# Seat 10    Minisquats  1x10 1x15 2x12         Knee flex stretch on step             LAQs  2x10 2x10 2x10 2x10  2x10 2x10     Prone quad stretch    NV  5x20\" 5x20\" 5x20\" 5x20\"    Prone hip ext    NV  X10, x5 2x10 2x10 2x10    Upright bike for ROM  6' seat 12 6' seat 12 10' seat 13 10' seat 11 10' seat 11 10' seat 11 L3 10' seat 11 L3 10' seat 11 L3    Ther Activity             Fwd step downs   6\" 1x10  2x10 4\" 2x15 6\"  2x10 6\"     Lat step downs   6\" 2x10 NV         Sit to stands c airex   2x10 NV 2x8 1x10; 1x12 1x10; 1x12 1x10; 1x15     Stairs negotiation             Gait Training                                       Modalities                               "

## 2024-05-01 ENCOUNTER — OFFICE VISIT (OUTPATIENT)
Dept: PHYSICAL THERAPY | Facility: CLINIC | Age: 84
End: 2024-05-01
Payer: MEDICARE

## 2024-05-01 DIAGNOSIS — Z96.652 S/P TOTAL KNEE ARTHROPLASTY, LEFT: Primary | ICD-10-CM

## 2024-05-01 PROCEDURE — 97110 THERAPEUTIC EXERCISES: CPT

## 2024-05-01 PROCEDURE — 97140 MANUAL THERAPY 1/> REGIONS: CPT

## 2024-05-01 PROCEDURE — 97112 NEUROMUSCULAR REEDUCATION: CPT

## 2024-05-01 NOTE — PROGRESS NOTES
"Daily Note     Today's date: 2024  Patient name: Rian Galeana  : 1940  MRN: [id number]  Referring provider: Lynn Bhatt MD  Dx:   Encounter Diagnosis     ICD-10-CM    1. S/P total knee arthroplasty, left  Z96.652                      Subjective: Pt reports his knee is improving all around.       Objective: See treatment diary below      Assessment: Tolerated treatment well. Pt demonstrates good effort throughout session. Minimal curing given to facilitate proper exercise performance and technique. He tolerates addition of step stretches well without complaints verbalized. Patient demonstrated fatigue post treatment, exhibited good technique with therapeutic exercises, and would benefit from continued PT      Plan: Continue per plan of care.      Precautions: R TKA ; L TKA 3/18    POC expires MC 30 Days PT/OT + Visit Limit?   6/3 5/29 BOMN            Visit/Unit Tracking  AUTH Status:  Date              10 visits to re-eval Used 1 2              Remaining  9 8                     Manuals 4/8 4/10 4/15 4/17 4/19 4/22 4/24 FOTO    L knee extension stretch 5x10\" 5x10\" CG 15x10\" 10x10\" 10x10\" 10x10\" 10x10\" 10x10\" 10x10\"   Patella mobs L knee 5' 3' CG 3'         Hamstring stretching             IASTM L lateral hamstring tendon 3' 5' CG 5' 5' 5' 5' 5' 5' np   Neuro Re-Ed             Pt education about re-assessment 10' 5'   3' 3'   5' 3'   SLR  2x10 2x10 2x10 2x10 2x12 2x12 2x12 2x15 2x15   Quad sets             Bridges   2x10 2x10         RDLs             S/L hip abd    1x15      1x15   Standing TKE       1x13 5\" grn 1x20  5\" grn 1x20  5\" grn 1x20  5\" grn   Ther Ex             HS sets c small bolster 2x5 5\" 15x10\" 15x10\" 15x10\" 15x10\" 15x10\" large bolster       SL leg press  3x10 35# seat 11 3x10 35# seat 11 3x10 45# seat 11 2x10 45#; 1x10 55# Seat 10 1x10 45#; 2x10 55# Seat 10 2x15 55#; 1x15 65# Seat 10 2x10  65#, 2x10 75# Seat 10 2x10  65#, 2x10 75# Seat 10 2x10  65#, 2x10 " "75# Seat 10   Minisquats  1x10 1x15 2x12         Hamstring stretch on step          20\"x3   Knee flex stretch on step          20\"x3   LAQs  2x10 2x10 2x10 2x10  2x10 2x10  2x10   Prone quad stretch    NV  5x20\" 5x20\" 5x20\" 5x20\" 5x20\"   Prone hip ext    NV  X10, x5 2x10 2x10 2x10 2x10   Upright bike for ROM  6' seat 12 6' seat 12 10' seat 13 10' seat 11 10' seat 11 10' seat 11 L3 10' seat 11 L3 10' seat 11 L3 10' seat  L3   Ther Activity             Fwd step downs   6\" 1x10  2x10 4\" 2x15 6\"  2x10 6\"  2x10 6\"   Lat step downs   6\" 2x10 NV         Sit to stands c airex   2x10 NV 2x8 1x10; 1x12 1x10; 1x12 1x10; 1x15  1x10; 1x15   Stairs negotiation             Gait Training                                       Modalities                                                  "

## 2024-05-03 ENCOUNTER — OFFICE VISIT (OUTPATIENT)
Dept: PHYSICAL THERAPY | Facility: CLINIC | Age: 84
End: 2024-05-03
Payer: MEDICARE

## 2024-05-03 DIAGNOSIS — Z96.652 S/P TOTAL KNEE ARTHROPLASTY, LEFT: Primary | ICD-10-CM

## 2024-05-03 PROCEDURE — 97110 THERAPEUTIC EXERCISES: CPT

## 2024-05-03 PROCEDURE — 97112 NEUROMUSCULAR REEDUCATION: CPT

## 2024-05-03 NOTE — PROGRESS NOTES
"Daily Note     Today's date: 5/3/2024  Patient name: Rian Galeana  : 1940  MRN: [id number]  Referring provider: Lynn Bhatt MD  Dx:   Encounter Diagnosis     ICD-10-CM    1. S/P total knee arthroplasty, left  Z96.652           Start Time: 0845  Stop Time: 931  Total time in clinic (min): 46 minutes    Subjective: Pt reports he had a good workout on Wednesday.      Objective: See treatment diary below      Assessment: Tolerated treatment well. Patient demonstrated fatigue post treatment, exhibited good technique with therapeutic exercises, and would benefit from continued PT. Pt progressing well, able to lower seat height for leg press and upright bike, able to strengthen at deeper knee flexion ROM. Continue with gradual progressions as tolerated.      Plan: Continue per plan of care.  Progress treatment as tolerated.       Precautions: R TKA ; L TKA 3/18    POC expires MC 30 Days PT/OT + Visit Limit?   6/3 5/29 BOMN            Visit/Unit Tracking  AUTH Status:  Date 4/29 5/1 5/3            10 visits to re-eval Used 1 2 3             Remaining  9 8 7                    Manuals 5/3   4/17 4/19 4/22 4/24 FOTO    L knee extension stretch 10x10\"   15x10\" 10x10\" 10x10\" 10x10\" 10x10\" 10x10\" 10x10\"   Patella mobs L knee    3'         Hamstring stretching             IASTM L lateral hamstring tendon    5' 5' 5' 5' 5' 5' np   Neuro Re-Ed             Pt education about re-assessment     3' 3'   5' 3'   SLR 3x12   2x10 2x10 2x12 2x12 2x12 2x15 2x15   Quad sets             Bridges    2x10         RDLs             S/L hip abd X10; x12   1x15      1x15   Standing TKE 1x20  5\" grn      1x13 5\" grn 1x20  5\" grn 1x20  5\" grn 1x20  5\" grn   Ther Ex             HS sets c small bolster    15x10\" 15x10\" 15x10\" large bolster       SL leg press 2x10  65#, 3x10 75# Seat 9   3x10 45# seat 11 2x10 45#; 1x10 55# Seat 10 1x10 45#; 2x10 55# Seat 10 2x15 55#; 1x15 65# Seat 10 2x10  65#, 2x10 75# Seat 10 " "2x10  65#, 2x10 75# Seat 10 2x10  65#, 2x10 75# Seat 10   Minisquats    2x12         Hamstring stretch on step          20\"x3   Knee flex stretch on step          20\"x3   LAQs 2x10   2x10 2x10  2x10 2x10  2x10   Prone quad stretch 5x20\"   NV  5x20\" 5x20\" 5x20\" 5x20\" 5x20\"   Prone hip ext X10; x12   NV  X10, x5 2x10 2x10 2x10 2x10   Upright bike for ROM 10' seat  9 L3   10' seat 13 10' seat 11 10' seat 11 10' seat 11 L3 10' seat 11 L3 10' seat 11 L3 10' seat  L3   Ther Activity             Fwd step downs     2x10 4\" 2x15 6\"  2x10 6\"  2x10 6\"   Lat step downs    NV         Sit to stands c airex 2x15   NV 2x8 1x10; 1x12 1x10; 1x12 1x10; 1x15  1x10; 1x15   Reverse lunges at bar 1x10 ea            Stairs negotiation             Gait Training                                       Modalities                                                    "

## 2024-05-06 ENCOUNTER — OFFICE VISIT (OUTPATIENT)
Dept: PHYSICAL THERAPY | Facility: CLINIC | Age: 84
End: 2024-05-06
Payer: MEDICARE

## 2024-05-06 DIAGNOSIS — Z96.652 S/P TOTAL KNEE ARTHROPLASTY, LEFT: Primary | ICD-10-CM

## 2024-05-06 PROCEDURE — 97140 MANUAL THERAPY 1/> REGIONS: CPT

## 2024-05-06 PROCEDURE — 97112 NEUROMUSCULAR REEDUCATION: CPT

## 2024-05-06 PROCEDURE — 97110 THERAPEUTIC EXERCISES: CPT

## 2024-05-06 NOTE — PROGRESS NOTES
"Daily Note     Today's date: 2024  Patient name: Rian Galeana  : 1940  MRN: [id number]  Referring provider: Lynn Bhatt MD  Dx:   Encounter Diagnosis     ICD-10-CM    1. S/P total knee arthroplasty, left  Z96.652           Start Time: 0845  Stop Time: 0930  Total time in clinic (min): 45 minutes    Subjective: Pt reports he feels stiff/slow this morning.      Objective: See treatment diary below      Assessment: Tolerated treatment well. Patient demonstrated fatigue post treatment, exhibited good technique with therapeutic exercises, and would benefit from continued PT. Continued tightness in posterior knee, restricting full knee extension, pt responds well to stretching. Pt continues to gradually progress resistance for leg press and other strengthening exercises with good tolerance.      Plan: Continue per plan of care.  Progress treatment as tolerated.       Precautions: R TKA ; L TKA 3/18    POC expires MC 30 Days PT/OT + Visit Limit?   6/3 5/29 BOMN            Visit/Unit Tracking  AUTH Status:  Date 4/29 5/1 5/3 5/6           10 visits to re-eval Used 1 2 3 4            Remaining  9 8 7 6                   Manuals 5/3 5/6    4/22 4/24 FOTO    L knee extension stretch 10x10\" 10x10\"    10x10\" 10x10\" 10x10\" 10x10\" 10x10\"   Patella mobs L knee  2'           Hamstring stretching             IASTM L lateral hamstring tendon      5' 5' 5' 5' np   Neuro Re-Ed             Pt education about re-assessment      3'   5' 3'   SLR 3x12 2x15    2x12 2x12 2x12 2x15 2x15   Bridges  2x10           RDLs             S/L hip abd X10; x12 2x10        1x15   Standing TKE 1x20  5\" grn NV     1x13 5\" grn 1x20  5\" grn 1x20  5\" grn 1x20  5\" grn   Ther Ex             HS sets c small bolster      15x10\" large bolster       SL leg press 2x10  65#, 3x10 75# Seat 9 2x20 75#; 2x10 85# seat 9    1x10 45#; 2x10 55# Seat 10 2x15 55#; 1x15 65# Seat 10 2x10  65#, 2x10 75# Seat 10 2x10  65#, 2x10 75# Seat 10 " "2x10  65#, 2x10 75# Seat 10   Minisquats             Hamstring stretch on step  NV        20\"x3   Knee flex stretch on step          20\"x3   LAQs 2x10 2x10     2x10 2x10  2x10   Prone quad stretch 5x20\" 5x20\"    5x20\" 5x20\" 5x20\" 5x20\" 5x20\"   Prone hip ext X10; x12 2x12    X10, x5 2x10 2x10 2x10 2x10   Upright bike for ROM 10' seat  9 L3 10' seat  9 L3    10' seat 11 10' seat 11 L3 10' seat 11 L3 10' seat 11 L3 10' seat  L3   Ther Activity             Fwd step downs      2x15 6\"  2x10 6\"  2x10 6\"   Lat step downs             Sit to stands c airex 2x15 2x15    1x10; 1x12 1x10; 1x12 1x10; 1x15  1x10; 1x15   Reverse lunges at bar 1x10 ea            Stairs negotiation             Gait Training                                       Modalities                                                      "

## 2024-05-08 ENCOUNTER — OFFICE VISIT (OUTPATIENT)
Dept: PHYSICAL THERAPY | Facility: CLINIC | Age: 84
End: 2024-05-08
Payer: MEDICARE

## 2024-05-08 DIAGNOSIS — Z96.652 S/P TOTAL KNEE ARTHROPLASTY, LEFT: Primary | ICD-10-CM

## 2024-05-08 PROCEDURE — 97112 NEUROMUSCULAR REEDUCATION: CPT

## 2024-05-08 PROCEDURE — 97110 THERAPEUTIC EXERCISES: CPT

## 2024-05-08 NOTE — PROGRESS NOTES
"Daily Note     Today's date: 2024  Patient name: Rian Galeana  : 1940  MRN: [id number]  Referring provider: Lynn Bhatt MD  Dx:   Encounter Diagnosis     ICD-10-CM    1. S/P total knee arthroplasty, left  Z96.652           Start Time: 0845  Stop Time: 09  Total time in clinic (min): 46 minutes    Subjective: Pt reports slight stiffness in the knee today.      Objective: See treatment diary below      Assessment: Tolerated treatment well. Patient demonstrated fatigue post treatment, exhibited good technique with therapeutic exercises, and would benefit from continued PT. Pt demonstrating sufficient quadriceps fatigue, initiated RDLs to stretch and strengthen hamstrings. Pt did well with these and demonstrated good technique. Pt fatigued at end of session with leg press.      Plan: Continue per plan of care.  Progress treatment as tolerated.       Precautions: R TKA ; L TKA 3/18    POC expires MC 30 Days PT/OT + Visit Limit?   6/3 5/29 BOMN            Visit/Unit Tracking  AUTH Status:  Date 4/29 5/1 5/3 5/6 5/8          10 visits to re-eval Used 1 2 3 4 5           Remaining  9 8 7 6 5                  Manuals 5/3 5/6 5/8   4/22 4/24 FOTO    L knee extension stretch 10x10\" 10x10\" 10x10\"   10x10\" 10x10\" 10x10\" 10x10\" 10x10\"   Patella mobs L knee  2' 2'          Hamstring stretching             IASTM L lateral hamstring tendon      5' 5' 5' 5' np   Neuro Re-Ed             Pt education about re-assessment      3'   5' 3'   SLR 3x12 2x15 3x12   2x12 2x12 2x12 2x15 2x15   Bridges  2x10           RDLs   2x10 15#          S/L hip abd X10; x12 2x10 3x10       1x15   Standing TKE 1x20  5\" grn NV 1x20  5\" grn    1x13 5\" grn 1x20  5\" grn 1x20  5\" grn 1x20  5\" grn   Ther Ex             HS sets c small bolster      15x10\" large bolster       SL leg press 2x10  65#, 3x10 75# Seat 9 2x20 75#; 2x10 85# seat 9 1x20 75#; 1x20 85# seat 9   1x10 45#; 2x10 55# Seat 10 2x15 55#; 1x15 65# Seat 10 " "2x10  65#, 2x10 75# Seat 10 2x10  65#, 2x10 75# Seat 10 2x10  65#, 2x10 75# Seat 10   Minisquats             Hamstring stretch on step  NV        20\"x3   Knee flex stretch on step          20\"x3   LAQs 2x10 2x10     2x10 2x10  2x10   Prone quad stretch 5x20\" 5x20\" 5x20\"   5x20\" 5x20\" 5x20\" 5x20\" 5x20\"   Prone hip ext X10; x12 2x12 2x10   X10, x5 2x10 2x10 2x10 2x10   Upright bike for ROM 10' seat  9 L3 10' seat  9 L3 10' seat  9 L3   10' seat 11 10' seat 11 L3 10' seat 11 L3 10' seat 11 L3 10' seat  L3   Ther Activity             Fwd step downs   2x10 6\"   2x15 6\"  2x10 6\"  2x10 6\"   Lat step downs             Sit to stands c airex 2x15 2x15 NV   1x10; 1x12 1x10; 1x12 1x10; 1x15  1x10; 1x15   Reverse lunges at bar 1x10 ea            Stairs negotiation             Gait Training                                       Modalities                                                        "

## 2024-05-10 ENCOUNTER — OFFICE VISIT (OUTPATIENT)
Dept: PHYSICAL THERAPY | Facility: CLINIC | Age: 84
End: 2024-05-10
Payer: MEDICARE

## 2024-05-10 DIAGNOSIS — Z96.652 S/P TOTAL KNEE ARTHROPLASTY, LEFT: Primary | ICD-10-CM

## 2024-05-10 PROCEDURE — 97140 MANUAL THERAPY 1/> REGIONS: CPT

## 2024-05-10 PROCEDURE — 97110 THERAPEUTIC EXERCISES: CPT

## 2024-05-10 PROCEDURE — 97112 NEUROMUSCULAR REEDUCATION: CPT

## 2024-05-10 NOTE — PROGRESS NOTES
"Daily Note     Today's date: 5/10/2024  Patient name: Rian Galeana  : 1940  MRN: [id number]  Referring provider: Lynn Bhatt MD  Dx:   Encounter Diagnosis     ICD-10-CM    1. S/P total knee arthroplasty, left  Z96.652           Start Time: 0845  Stop Time: 09  Total time in clinic (min): 45 minutes    Subjective: Patient reports to physical therapy today stating his L knee is feeling pretty good.         Objective: See treatment diary below      Assessment: Tolerated treatment well. Patient demonstrated fatigue post treatment, exhibited good technique with therapeutic exercises, and would benefit from continued PT. Continues to provide good effort during performance of exercises. Minimal verbal cues needed to correct patient form during exercises. Will continue to assess patient tolerance and progress next visit, as able.         Plan: Continue per plan of care.      Precautions: R TKA ; L TKA 3/18    POC expires MC 30 Days PT/OT + Visit Limit?   6/3 5/29 BOMN            Visit/Unit Tracking  AUTH Status:  Date 4/29 5/1 5/3 5/6 5/8 5/10         10 visits to re-eval Used 1 2 3 4 5 6          Remaining  9 8 7 6 5 4                 Manuals 5/3 5/6 5/8 5/10  4/22 4/24 FOTO    L knee extension stretch 10x10\" 10x10\" 10x10\" 10x10\"  10x10\" 10x10\" 10x10\" 10x10\" 10x10\"   Patella mobs L knee  2' 2' 2'         Hamstring stretching             IASTM L lateral hamstring tendon      5' 5' 5' 5' np   Neuro Re-Ed             Pt education about re-assessment      3'   5' 3'   SLR 3x12 2x15 3x12 3x12  2x12 2x12 2x12 2x15 2x15   Bridges  2x10  2x10         RDLs   2x10 15# 2x10 15#         S/L hip abd X10; x12 2x10 3x10 3x10      1x15   Standing TKE 1x20  5\" grn NV 1x20  5\" grn 1x20  5\" grn   1x13 5\" grn 1x20  5\" grn 1x20  5\" grn 1x20  5\" grn   Ther Ex             HS sets c small bolster      15x10\" large bolster       SL leg press 2x10  65#, 3x10 75# Seat 9 2x20 75#; 2x10 85# seat 9 1x20 75#; 1x20 " "85# seat 9 1x20 75#; 1x20 85# seat 9  1x10 45#; 2x10 55# Seat 10 2x15 55#; 1x15 65# Seat 10 2x10  65#, 2x10 75# Seat 10 2x10  65#, 2x10 75# Seat 10 2x10  65#, 2x10 75# Seat 10   Minisquats             Hamstring stretch on step  NV        20\"x3   Knee flex stretch on step          20\"x3   LAQs 2x10 2x10  2x10   2x10 2x10  2x10   Prone quad stretch 5x20\" 5x20\" 5x20\" 5x20\"  5x20\" 5x20\" 5x20\" 5x20\" 5x20\"   Prone hip ext X10; x12 2x12 2x10 2x10   X10, x5 2x10 2x10 2x10 2x10   Upright bike for ROM 10' seat  9 L3 10' seat  9 L3 10' seat  9 L3 10' seat  9 L3  10' seat 11 10' seat 11 L3 10' seat 11 L3 10' seat 11 L3 10' seat  L3   Ther Activity             Fwd step downs   2x10 6\" 2x10 6\"  2x15 6\"  2x10 6\"  2x10 6\"   Lat step downs             Sit to stands c airex 2x15 2x15 NV 2x15  1x10; 1x12 1x10; 1x12 1x10; 1x15  1x10; 1x15   Reverse lunges at bar 1x10 ea            Stairs negotiation             Gait Training                                       Modalities                                                          "

## 2024-05-14 ENCOUNTER — APPOINTMENT (OUTPATIENT)
Dept: PHYSICAL THERAPY | Facility: CLINIC | Age: 84
End: 2024-05-14
Payer: MEDICARE

## 2024-05-17 ENCOUNTER — OFFICE VISIT (OUTPATIENT)
Dept: PHYSICAL THERAPY | Facility: CLINIC | Age: 84
End: 2024-05-17
Payer: MEDICARE

## 2024-05-17 DIAGNOSIS — Z96.652 S/P TOTAL KNEE ARTHROPLASTY, LEFT: Primary | ICD-10-CM

## 2024-05-17 PROCEDURE — 97530 THERAPEUTIC ACTIVITIES: CPT

## 2024-05-17 PROCEDURE — 97112 NEUROMUSCULAR REEDUCATION: CPT

## 2024-05-17 PROCEDURE — 97110 THERAPEUTIC EXERCISES: CPT

## 2024-05-17 NOTE — PROGRESS NOTES
"Daily Note     Today's date: 2024  Patient name: Rian Galeana  : 1940  MRN: [id number]  Referring provider: Lynn Bhatt MD  Dx:   Encounter Diagnosis     ICD-10-CM    1. S/P total knee arthroplasty, left  Z96.652           Start Time: 08  Stop Time: 928  Total time in clinic (min): 43 minutes    Subjective: Pt reports leg feels a little tired from missing a few PT sessions.      Objective: See treatment diary below      Assessment: Tolerated treatment well. Patient demonstrated fatigue post treatment, exhibited good technique with therapeutic exercises, and would benefit from continued PT. Pt did well with with interventions this session. Will ease back into sit to stands next session as pt is most challenged by these. Leg fatigue reported at end of session but knee feels better.      Plan: Continue per plan of care.  Progress treatment as tolerated.       Precautions: R TKA ; L TKA 3/18    POC expires MC 30 Days PT/OT + Visit Limit?   6/3 5/29 BOMN            Visit/Unit Tracking  AUTH Status:  Date 4/29 5/1 5/3 5/6 5/8 5/10 5/17        10 visits to re-eval Used 1 2 3 4 5 6 7         Remaining  9 8 7 6 5 4 3                Manuals 5/3 5/6 5/8 5/10 5/17   4/26 4/29 5/1   L knee extension stretch 10x10\" 10x10\" 10x10\" 10x10\" 10x10\"   10x10\" 10x10\" 10x10\"   Patella mobs L knee  2' 2' 2'         Hamstring stretching             IASTM L lateral hamstring tendon        5' 5' np   Neuro Re-Ed             Pt education about re-assessment         5' 3'   SLR 3x12 2x15 3x12 3x12 3x15   2x12 2x15 2x15   Bridges  2x10  2x10         RDLs   2x10 15# 2x10 15#         S/L hip abd X10; x12 2x10 3x10 3x10 2x15     1x15   Standing TKE 1x20  5\" grn NV 1x20  5\" grn 1x20  5\" grn 1x20  5\" grn   1x20  5\" grn 1x20  5\" grn 1x20  5\" grn   Ther Ex             HS sets c small bolster             SL leg press 2x10  65#, 3x10 75# Seat 9 2x20 75#; 2x10 85# seat 9 1x20 75#; 1x20 85# seat 9 1x20 75#; 1x20 85# seat 9 2x20 " "75#; 1x20 85# seat 9   2x10  65#, 2x10 75# Seat 10 2x10  65#, 2x10 75# Seat 10 2x10  65#, 2x10 75# Seat 10   Minisquats             Hamstring stretch on step  NV        20\"x3   Knee flex stretch on step          20\"x3   LAQs 2x10 2x10  2x10    2x10  2x10   Prone quad stretch 5x20\" 5x20\" 5x20\" 5x20\" 5x20\"   5x20\" 5x20\" 5x20\"   Prone hip ext X10; x12 2x12 2x10 2x10  3x10   2x10 2x10 2x10   Upright bike for ROM 10' seat  9 L3 10' seat  9 L3 10' seat  9 L3 10' seat  9 L3 10' seat  10 L3   10' seat 11 L3 10' seat 11 L3 10' seat  L3   Ther Activity             Fwd step downs   2x10 6\" 2x10 6\" 3x10 6\"   2x10 6\"  2x10 6\"   Lat step downs             Sit to stands c airex 2x15 2x15 NV 2x15 NV   1x10; 1x15  1x10; 1x15   Reverse lunges at bar 1x10 ea            Stairs negotiation             Gait Training                                       Modalities                                                            "

## 2024-05-20 ENCOUNTER — OFFICE VISIT (OUTPATIENT)
Dept: PHYSICAL THERAPY | Facility: CLINIC | Age: 84
End: 2024-05-20
Payer: MEDICARE

## 2024-05-20 DIAGNOSIS — Z96.652 S/P TOTAL KNEE ARTHROPLASTY, LEFT: Primary | ICD-10-CM

## 2024-05-20 PROCEDURE — 97530 THERAPEUTIC ACTIVITIES: CPT

## 2024-05-20 PROCEDURE — 97110 THERAPEUTIC EXERCISES: CPT

## 2024-05-20 PROCEDURE — 97112 NEUROMUSCULAR REEDUCATION: CPT

## 2024-05-20 NOTE — PROGRESS NOTES
"Daily Note     Today's date: 2024  Patient name: Rian Galeana  : 1940  MRN: [id number]  Referring provider: Lynn Bhatt MD  Dx:   Encounter Diagnosis     ICD-10-CM    1. S/P total knee arthroplasty, left  Z96.652           Start Time: 0845  Stop Time: 0930  Total time in clinic (min): 45 minutes    Subjective: Pt reports no new complaints today.       Objective: See treatment diary below      Assessment: Tolerated treatment well. Pt demonstrates good effort throughout session. Minimal curing given to facilitate proper exercise performance and technique. He tolerates recent progressions well and also increased weight for leg press today. Patient demonstrated fatigue post treatment, exhibited good technique with therapeutic exercises, and would benefit from continued PT      Plan: Continue per plan of care.      Precautions: R TKA ; L TKA 3/18    POC expires MC 30 Days PT/OT + Visit Limit?   6/3 5/29 BOMN            Visit/Unit Tracking  AUTH Status:  Date 4/29 5/1 5/3 5/6 5/8 5/10 5/17 5/20       10 visits to re-eval Used 1 2 3 4 5 6 7 8        Remaining  9 8 7 6 5 4 3 2               Manuals 5/3 5/6 5/8 5/10 5/17 5/20  4/26 4/29 5/1   L knee extension stretch 10x10\" 10x10\" 10x10\" 10x10\" 10x10\" 10x10\"  10x10\" 10x10\" 10x10\"   Patella mobs L knee  2' 2' 2'  2'       Hamstring stretching             IASTM L lateral hamstring tendon        5' 5' np   Neuro Re-Ed             Pt education about re-assessment         5' 3'   SLR 3x12 2x15 3x12 3x12 3x15 3x15  2x12 2x15 2x15   Bridges  2x10  2x10  2x10       RDLs   2x10 15# 2x10 15#         S/L hip abd X10; x12 2x10 3x10 3x10 2x15 2x15    1x15   Standing TKE 1x20  5\" grn NV 1x20  5\" grn 1x20  5\" grn 1x20  5\" grn 1x20 5\" grn  1x20  5\" grn 1x20  5\" grn 1x20  5\" grn   Ther Ex             HS sets c small bolster             SL leg press 2x10  65#, 3x10 75# Seat 9 2x20 75#; 2x10 85# seat 9 1x20 75#; 1x20 85# seat 9 1x20 75#; 1x20 85# seat 9 2x20 75#; " "1x20 85# seat 9 2x20 85#; 1x15 95# seat 8  2x10  65#, 2x10 75# Seat 10 2x10  65#, 2x10 75# Seat 10 2x10  65#, 2x10 75# Seat 10   Minisquats             Hamstring stretch on step  NV        20\"x3   Knee flex stretch on step          20\"x3   LAQs 2x10 2x10  2x10  2x10  2x10  2x10   Prone quad stretch 5x20\" 5x20\" 5x20\" 5x20\" 5x20\" 5x20\"  5x20\" 5x20\" 5x20\"   Prone hip ext X10; x12 2x12 2x10 2x10  3x10 3x10  2x10 2x10 2x10   Upright bike for ROM 10' seat  9 L3 10' seat  9 L3 10' seat  9 L3 10' seat  9 L3 10' seat  10 L3 10' seat 10 L3  10' seat 11 L3 10' seat 11 L3 10' seat  L3   Ther Activity             Fwd step downs   2x10 6\" 2x10 6\" 3x10 6\" 3x10 6\"  2x10 6\"  2x10 6\"   Lat step downs             Sit to stands c airex 2x15 2x15 NV 2x15 NV 2x15  1x10; 1x15  1x10; 1x15   Reverse lunges at bar 1x10 ea            Stairs negotiation             Gait Training                                       Modalities                                                              "

## 2024-05-22 ENCOUNTER — OFFICE VISIT (OUTPATIENT)
Dept: PHYSICAL THERAPY | Facility: CLINIC | Age: 84
End: 2024-05-22
Payer: MEDICARE

## 2024-05-22 DIAGNOSIS — Z96.652 S/P TOTAL KNEE ARTHROPLASTY, LEFT: Primary | ICD-10-CM

## 2024-05-22 PROCEDURE — 97530 THERAPEUTIC ACTIVITIES: CPT

## 2024-05-22 PROCEDURE — 97112 NEUROMUSCULAR REEDUCATION: CPT

## 2024-05-22 PROCEDURE — 97110 THERAPEUTIC EXERCISES: CPT

## 2024-05-22 NOTE — PROGRESS NOTES
"Daily Note     Today's date: 2024  Patient name: Rian Galeana  : 1940  MRN: [id number]  Referring provider: Lynn Bhatt MD  Dx:   Encounter Diagnosis     ICD-10-CM    1. S/P total knee arthroplasty, left  Z96.652           Start Time: 0845  Stop Time: 0930  Total time in clinic (min): 45 minutes    Subjective: Pt reports feeling good since previous session, able to swing his golf club a bit.      Objective: See treatment diary below      Assessment: Tolerated treatment well. Patient demonstrated fatigue post treatment, exhibited good technique with therapeutic exercises, and would benefit from continued PT. Pt's knee ROM is doing very well and progressing his functional level. Made progressions with more dynamic activity and more dynamic stability/balance. Pt did well with progressions today.      Plan: Continue per plan of care.  Progress treatment as tolerated.       Precautions: R TKA ; L TKA 3/18    POC expires MC 30 Days PT/OT + Visit Limit?   6/3 5/29 BOMN            Visit/Unit Tracking  AUTH Status:  Date 4/29 5/1 5/3 5/6 5/8 5/10 5/17 5/20 5/22      10 visits to re-eval Used 1 2 3 4 5 6 7 8 9       Remaining  9 8 7 6 5 4 3 2 1              Manuals 5/3 5/6 5/8 5/10 5/17 5/20 5/22   5/1   L knee extension stretch 10x10\" 10x10\" 10x10\" 10x10\" 10x10\" 10x10\" 10x10\"   10x10\"   Patella mobs L knee  2' 2' 2'  2'       Hamstring stretching             IASTM L lateral hamstring tendon          np   Neuro Re-Ed             Pt education about re-assessment       3'   3'   SLR 3x12 2x15 3x12 3x12 3x15 3x15 3x15   2x15   Bridges  2x10  2x10  2x10 2x10      RDLs   2x10 15# 2x10 15#   2x10 15#      S/L hip abd X10; x12 2x10 3x10 3x10 2x15 2x15 2x15   1x15   Standing TKE 1x20  5\" grn NV 1x20  5\" grn 1x20  5\" grn 1x20  5\" grn 1x20 5\" grn    1x20  5\" grn   BOSU step ups       1x10 ea                   Ther Ex             HS sets c small bolster             SL leg press 2x10  65#, 3x10 75# Seat 9 2x20 " "75#; 2x10 85# seat 9 1x20 75#; 1x20 85# seat 9 1x20 75#; 1x20 85# seat 9 2x20 75#; 1x20 85# seat 9 2x20 85#; 1x15 95# seat 8 1x20 85#; 2x15 95# seat 8   2x10  65#, 2x10 75# Seat 10   Hamstring stretch on step  NV        20\"x3   Knee flex stretch on step          20\"x3   LAQs 2x10 2x10  2x10  2x10    2x10   Prone quad stretch 5x20\" 5x20\" 5x20\" 5x20\" 5x20\" 5x20\" 5x20\"   5x20\"   Prone hip ext X10; x12 2x12 2x10 2x10  3x10 3x10    2x10   Upright bike for ROM 10' seat  9 L3 10' seat  9 L3 10' seat  9 L3 10' seat  9 L3 10' seat  10 L3 10' seat 10 L3 10' seat 10 L3   10' seat  L3   Ther Activity             Fwd step downs   2x10 6\" 2x10 6\" 3x10 6\" 3x10 6\"    2x10 6\"   Lat step downs             Sit to stands c airex 2x15 2x15 NV 2x15 NV 2x15 NV   1x10; 1x15   Reverse lunges at bar 1x10 ea      1x10-12 ea      Rebounder 2-hand toss       X20 pink         Gait Training                                       Modalities                                                                "

## 2024-05-24 ENCOUNTER — EVALUATION (OUTPATIENT)
Dept: PHYSICAL THERAPY | Facility: CLINIC | Age: 84
End: 2024-05-24
Payer: MEDICARE

## 2024-05-24 DIAGNOSIS — Z96.652 S/P TOTAL KNEE ARTHROPLASTY, LEFT: Primary | ICD-10-CM

## 2024-05-24 PROCEDURE — 97112 NEUROMUSCULAR REEDUCATION: CPT

## 2024-05-24 PROCEDURE — 97110 THERAPEUTIC EXERCISES: CPT

## 2024-05-24 NOTE — LETTER
May 24, 2024    Lynn Bhatt MD  [address] [facility] [address]    Patient: Rian Galeana   YOB: 1940   Date of Visit: 2024     Encounter Diagnosis     ICD-10-CM    1. S/P total knee arthroplasty, left  Z96.652           Dear Dr. Bhatt:    Thank you for your recent referral of Rian Galeana. Please review the attached evaluation summary from Rian's recent visit.     Please verify that you agree with the plan of care by signing the attached order.     If you have any questions or concerns, please do not hesitate to call.     I sincerely appreciate the opportunity to share in the care of one of your patients and hope to have another opportunity to work with you in the near future.       Sincerely,    Branden Hatch, PT      Referring Provider:      I certify that I have read the below Plan of Care and certify the need for these services furnished under this plan of treatment while under my care.                    Lynn Bhatt MD  [address] [facility] [address]  Via Fax: [phone]          PT Re-Evaluation     Today's date: 2024  Patient name: Rian Galeana  : 1940  MRN: [id number]  Referring provider: Lynn Bhatt MD  Dx:   Encounter Diagnosis     ICD-10-CM    1. S/P total knee arthroplasty, left  Z96.652             Start Time: 0845  Stop Time: 0930  Total time in clinic (min): 45 minutes    Assessment  Impairments: abnormal gait, abnormal or restricted ROM, activity intolerance, impaired balance, impaired physical strength and pain with function  Functional limitations: distance ambulation, stairs, and recreation  Symptom irritability: low    Assessment details: Pt is a 83 y.o. male presenting to physical therapy with s/p L TKA on 3/18/2024. Upon re-evaluation, pt demonstrates good improvement of L knee extension ROM and reached full extension ROM. Pt also demonstrates improved strength of L knee and hip. Pt has progressed to work on more  stability and balance in order to facilitate return to his prior activities. Continue to facilitate improved edema and knee flexion ROM as well. Pt is a good candidate for continued skilled PT to return him to prior level of function.      Goals  STG 4 - weeks  1. Patient will demonstrate improved L knee extension ROM to 0 degrees to facilitate functional ability. -partially met  2. Patient will demonstrate improved L knee flexion strength to 4-/5 or better to facilitate functional ability. -met  LTG 8 - weeks  1. Patient will demonstrate ability to ambulate 250' with good mechanics and 1/10 pain or less. -met  2. Patient will demonstrate increased FOTO score by 10 points or more from baseline. -met    Plan  Patient would benefit from: PT eval and skilled physical therapy  Planned modality interventions: cryotherapy, thermotherapy: hydrocollator packs and unattended electrical stimulation    Planned therapy interventions: ADL retraining, flexibility, functional ROM exercises, home exercise program, joint mobilization, manual therapy, massage, patient education, strengthening, stretching, therapeutic activities, therapeutic exercise, neuromuscular re-education, gait training and balance/weight bearing training    Frequency: 3x week  Duration in weeks: 2  Plan of Care beginning date: 5/24/2024  Plan of Care expiration date: 6/7/2024  Treatment plan discussed with: patient        Subjective Evaluation    History of Present Illness  Mechanism of injury: Pt presents s/p L knee replacement on 3/18/24. Pt had his R knee replaced on 9/9/24 and completed outpatient therapy for this. Pt reports he went home the same day after operation. Pt then underwent home health therapy 3x/wk for two weeks. Pt was ambulating with rolling walker initially for first few days and then transitioned to single point cane.     Upon re-evaluation, pt reports he continues to make good progress with physical therapy. Pt has been able to gradually  "increase his walking distance and start to swing his golf clubs. Pt still has some swelling in his L leg and still feels that his L leg is weaker overall. Pt felt good with doing balance exercises previous session and would like to continue with doing balance exercises to help him feel more stable.  Patient Goals  Patient goals for therapy: decreased pain, decreased edema, increased motion, increased strength and independence with ADLs/IADLs  Patient goal: Walk long distances, uneven ground, no device. Normal stairs negotiation  Pain  Current pain ratin  At best pain ratin  At worst pain ratin  Location: L knee  Quality: dull ache and discomfort  Relieving factors: medications  Aggravating factors: stair climbing and walking  Progression: improved          Objective     Observations   Left Knee   Positive for edema.     Active Range of Motion   Left Knee   Flexion: 123 degrees   Extension: 0 degrees   Extensor la degrees     Right Knee   Flexion: 128 degrees   Extension: 0 degrees   Extensor la degrees     Mobility   Patellar Mobility:   Left Knee   Hypomobile: left superior and left inferior    Strength/Myotome Testing     Left Hip   Planes of Motion   Flexion: 4-  Abduction: 4-    Right Hip   Planes of Motion   Flexion: 5  Extension: 4+  Abduction: 4+    Left Knee   Flexion: 4+  Extension: 5    Right Knee   Flexion: 5  Extension: 5    Ambulation     Observational Gait   Walking speed and stride length within functional limits.     Additional Observational Gait Details  No device    Functional Assessment        Single Leg Stance   Left: 15 seconds  Right: 14 seconds             Precautions: R TKA ; L TKA 3/18    POC expires MC 30 Days PT/OT + Visit Limit?   6/3 5/29 BOMN            Visit/Unit Tracking  AUTH Status:  Date               10 visits to re-eval Used                Remaining                        Manuals 5/3 5/6 5/8 5/10 5/17 5/20 5/22 5/24  5/1   L knee extension stretch 10x10\" " "10x10\" 10x10\" 10x10\" 10x10\" 10x10\" 10x10\" 10x10\"  10x10\"   Patella mobs L knee  2' 2' 2'  2'       Hamstring stretching             IASTM L lateral hamstring tendon          np   Neuro Re-Ed             Pt education about re-assessment       3' 12'  3'   SLR 3x12 2x15 3x12 3x12 3x15 3x15 3x15 3x15 1#  2x15   Bridges  2x10  2x10  2x10 2x10      RDLs   2x10 15# 2x10 15#   2x10 15#      S/L hip abd X10; x12 2x10 3x10 3x10 2x15 2x15 2x15   1x15   Standing TKE 1x20  5\" grn NV 1x20  5\" grn 1x20  5\" grn 1x20  5\" grn 1x20 5\" grn  1x12 5\" blue  1x20  5\" grn   BOSU step ups       1x10 ea 2x10 ea     BOSU upside down stance        10x5-10\"     Ther Ex             SL leg press 2x10  65#, 3x10 75# Seat 9 2x20 75#; 2x10 85# seat 9 1x20 75#; 1x20 85# seat 9 1x20 75#; 1x20 85# seat 9 2x20 75#; 1x20 85# seat 9 2x20 85#; 1x15 95# seat 8 1x20 85#; 2x15 95# seat 8 1x20 85#; 2x12 95# seat 8  2x10  65#, 2x10 75# Seat 10   Hamstring stretch on step  NV        20\"x3   Knee flex stretch on step          20\"x3   LAQs 2x10 2x10  2x10  2x10    2x10   Prone quad stretch 5x20\" 5x20\" 5x20\" 5x20\" 5x20\" 5x20\" 5x20\" 5x20\"  5x20\"   Prone hip ext X10; x12 2x12 2x10 2x10  3x10 3x10    2x10   Upright bike for ROM 10' seat  9 L3 10' seat  9 L3 10' seat  9 L3 10' seat  9 L3 10' seat  10 L3 10' seat 10 L3 10' seat 10 L3   10' seat  L3   Ther Activity             Fwd step downs   2x10 6\" 2x10 6\" 3x10 6\" 3x10 6\"    2x10 6\"   Lat step downs             Sit to stands c airex 2x15 2x15 NV 2x15 NV 2x15 NV   1x10; 1x15   Reverse lunges at bar 1x10 ea      1x10-12 ea      Rebounder 2-hand toss       X20 pink    X20 pink     Gait Training                                       Modalities                                                              "

## 2024-05-24 NOTE — PROGRESS NOTES
PT Re-Evaluation     Today's date: 2024  Patient name: Rian Galeana  : 1940  MRN: [id number]  Referring provider: Lynn Bhatt MD  Dx:   Encounter Diagnosis     ICD-10-CM    1. S/P total knee arthroplasty, left  Z96.652             Start Time: 0845  Stop Time: 0930  Total time in clinic (min): 45 minutes    Assessment  Impairments: abnormal gait, abnormal or restricted ROM, activity intolerance, impaired balance, impaired physical strength and pain with function  Functional limitations: distance ambulation, stairs, and recreation  Symptom irritability: low    Assessment details: Pt is a 83 y.o. male presenting to physical therapy with s/p L TKA on 3/18/2024. Upon re-evaluation, pt demonstrates good improvement of L knee extension ROM and reached full extension ROM. Pt also demonstrates improved strength of L knee and hip. Pt has progressed to work on more stability and balance in order to facilitate return to his prior activities. Continue to facilitate improved edema and knee flexion ROM as well. Pt is a good candidate for continued skilled PT to return him to prior level of function.      Goals  STG 4 - weeks  1. Patient will demonstrate improved L knee extension ROM to 0 degrees to facilitate functional ability. -partially met  2. Patient will demonstrate improved L knee flexion strength to 4-/5 or better to facilitate functional ability. -met  LTG 8 - weeks  1. Patient will demonstrate ability to ambulate 250' with good mechanics and 1/10 pain or less. -met  2. Patient will demonstrate increased FOTO score by 10 points or more from baseline. -met    Plan  Patient would benefit from: PT eval and skilled physical therapy  Planned modality interventions: cryotherapy, thermotherapy: hydrocollator packs and unattended electrical stimulation    Planned therapy interventions: ADL retraining, flexibility, functional ROM exercises, home exercise program, joint mobilization, manual therapy, massage,  patient education, strengthening, stretching, therapeutic activities, therapeutic exercise, neuromuscular re-education, gait training and balance/weight bearing training    Frequency: 3x week  Duration in weeks: 2  Plan of Care beginning date: 2024  Plan of Care expiration date: 2024  Treatment plan discussed with: patient        Subjective Evaluation    History of Present Illness  Mechanism of injury: Pt presents s/p L knee replacement on 3/18/24. Pt had his R knee replaced on 24 and completed outpatient therapy for this. Pt reports he went home the same day after operation. Pt then underwent home health therapy 3x/wk for two weeks. Pt was ambulating with rolling walker initially for first few days and then transitioned to single point cane.     Upon re-evaluation, pt reports he continues to make good progress with physical therapy. Pt has been able to gradually increase his walking distance and start to swing his golf clubs. Pt still has some swelling in his L leg and still feels that his L leg is weaker overall. Pt felt good with doing balance exercises previous session and would like to continue with doing balance exercises to help him feel more stable.  Patient Goals  Patient goals for therapy: decreased pain, decreased edema, increased motion, increased strength and independence with ADLs/IADLs  Patient goal: Walk long distances, uneven ground, no device. Normal stairs negotiation  Pain  Current pain ratin  At best pain ratin  At worst pain ratin  Location: L knee  Quality: dull ache and discomfort  Relieving factors: medications  Aggravating factors: stair climbing and walking  Progression: improved          Objective     Observations   Left Knee   Positive for edema.     Active Range of Motion   Left Knee   Flexion: 123 degrees   Extension: 0 degrees   Extensor la degrees     Right Knee   Flexion: 128 degrees   Extension: 0 degrees   Extensor la degrees     Mobility  "  Patellar Mobility:   Left Knee   Hypomobile: left superior and left inferior    Strength/Myotome Testing     Left Hip   Planes of Motion   Flexion: 4-  Abduction: 4-    Right Hip   Planes of Motion   Flexion: 5  Extension: 4+  Abduction: 4+    Left Knee   Flexion: 4+  Extension: 5    Right Knee   Flexion: 5  Extension: 5    Ambulation     Observational Gait   Walking speed and stride length within functional limits.     Additional Observational Gait Details  No device    Functional Assessment        Single Leg Stance   Left: 15 seconds  Right: 14 seconds             Precautions: R TKA 9/9; L TKA 3/18    POC expires MC 30 Days PT/OT + Visit Limit?   6/3 5/29 BOMN            Visit/Unit Tracking  AUTH Status:  Date               10 visits to re-eval Used                Remaining                        Manuals 5/3 5/6 5/8 5/10 5/17 5/20 5/22 5/24  5/1   L knee extension stretch 10x10\" 10x10\" 10x10\" 10x10\" 10x10\" 10x10\" 10x10\" 10x10\"  10x10\"   Patella mobs L knee  2' 2' 2'  2'       Hamstring stretching             IASTM L lateral hamstring tendon          np   Neuro Re-Ed             Pt education about re-assessment       3' 12'  3'   SLR 3x12 2x15 3x12 3x12 3x15 3x15 3x15 3x15 1#  2x15   Bridges  2x10  2x10  2x10 2x10      RDLs   2x10 15# 2x10 15#   2x10 15#      S/L hip abd X10; x12 2x10 3x10 3x10 2x15 2x15 2x15   1x15   Standing TKE 1x20  5\" grn NV 1x20  5\" grn 1x20  5\" grn 1x20  5\" grn 1x20 5\" grn  1x12 5\" blue  1x20  5\" grn   BOSU step ups       1x10 ea 2x10 ea     BOSU upside down stance        10x5-10\"     Ther Ex             SL leg press 2x10  65#, 3x10 75# Seat 9 2x20 75#; 2x10 85# seat 9 1x20 75#; 1x20 85# seat 9 1x20 75#; 1x20 85# seat 9 2x20 75#; 1x20 85# seat 9 2x20 85#; 1x15 95# seat 8 1x20 85#; 2x15 95# seat 8 1x20 85#; 2x12 95# seat 8  2x10  65#, 2x10 75# Seat 10   Hamstring stretch on step  NV        20\"x3   Knee flex stretch on step          20\"x3   LAQs 2x10 2x10  2x10  2x10    2x10   Prone quad " "stretch 5x20\" 5x20\" 5x20\" 5x20\" 5x20\" 5x20\" 5x20\" 5x20\"  5x20\"   Prone hip ext X10; x12 2x12 2x10 2x10  3x10 3x10    2x10   Upright bike for ROM 10' seat  9 L3 10' seat  9 L3 10' seat  9 L3 10' seat  9 L3 10' seat  10 L3 10' seat 10 L3 10' seat 10 L3   10' seat  L3   Ther Activity             Fwd step downs   2x10 6\" 2x10 6\" 3x10 6\" 3x10 6\"    2x10 6\"   Lat step downs             Sit to stands c airex 2x15 2x15 NV 2x15 NV 2x15 NV   1x10; 1x15   Reverse lunges at bar 1x10 ea      1x10-12 ea      Rebounder 2-hand toss       X20 pink    X20 pink     Gait Training                                       Modalities                                              "

## 2024-05-29 ENCOUNTER — OFFICE VISIT (OUTPATIENT)
Dept: PHYSICAL THERAPY | Facility: CLINIC | Age: 84
End: 2024-05-29
Payer: MEDICARE

## 2024-05-29 DIAGNOSIS — Z96.652 S/P TOTAL KNEE ARTHROPLASTY, LEFT: Primary | ICD-10-CM

## 2024-05-29 PROCEDURE — 97140 MANUAL THERAPY 1/> REGIONS: CPT

## 2024-05-29 PROCEDURE — 97110 THERAPEUTIC EXERCISES: CPT

## 2024-05-29 PROCEDURE — 97112 NEUROMUSCULAR REEDUCATION: CPT

## 2024-05-29 NOTE — PROGRESS NOTES
"Daily Note     Today's date: 2024  Patient name: Rian Galeana  : 1940  MRN: [id number]  Referring provider: Lynn Bhatt MD  Dx:   Encounter Diagnosis     ICD-10-CM    1. S/P total knee arthroplasty, left  Z96.652                      Subjective: Pt reports he is ready for Friday to be his last day.       Objective: See treatment diary below      Assessment: Tolerated treatment well. Pt demonstrates good effort throughout session. Minimal curing given to facilitate proper exercise performance and technique. He demonstrates improvement with balance exercises added to program last visit. He reports fatigue by end of session. Patient demonstrated fatigue post treatment, exhibited good technique with therapeutic exercises, and would benefit from continued PT      Plan: Continue per plan of care.  Potential discharge next visit.     Precautions: R TKA ; L TKA 3/18    POC expires MC 30 Days PT/OT + Visit Limit?    BOMN            Visit/Unit Tracking  AUTH Status:  Date              10 visits to re-eval Used 1 2              Remaining                        Manuals 5/3 5/6 5/8 5/10 5/17 5/20 5/22 5/24 5/29    L knee extension stretch 10x10\" 10x10\" 10x10\" 10x10\" 10x10\" 10x10\" 10x10\" 10x10\" 10x10\"    Patella mobs L knee  2' 2' 2'  2'       Hamstring stretching             IASTM L lateral hamstring tendon             Neuro Re-Ed             Pt education about re-assessment       3' 12' 4'    SLR 3x12 2x15 3x12 3x12 3x15 3x15 3x15 3x15 1# 3x15 1#    Bridges  2x10  2x10  2x10 2x10      RDLs   2x10 15# 2x10 15#   2x10 15#  2x10 25#    S/L hip abd X10; x12 2x10 3x10 3x10 2x15 2x15 2x15      Standing TKE 1x20  5\" grn NV 1x20  5\" grn 1x20  5\" grn 1x20  5\" grn 1x20 5\" grn  1x12 5\" blue 1x15 5\" blue    BOSU step ups       1x10 ea 2x10 ea 2x10 ea    BOSU upside down stance        10x5-10\" 20\"x2    Ther Ex             SL leg press 2x10  65#, 3x10 75# Seat 9 2x20 75#; 2x10 85# seat 9 1x20 75#; " "1x20 85# seat 9 1x20 75#; 1x20 85# seat 9 2x20 75#; 1x20 85# seat 9 2x20 85#; 1x15 95# seat 8 1x20 85#; 2x15 95# seat 8 1x20 85#; 2x12 95# seat 8 1x20 85#; 2x12-15 95# seat 8    Hamstring stretch on step  NV           Knee flex stretch on step             LAQs 2x10 2x10  2x10  2x10       Prone quad stretch 5x20\" 5x20\" 5x20\" 5x20\" 5x20\" 5x20\" 5x20\" 5x20\" 5x20\"    Prone hip ext X10; x12 2x12 2x10 2x10  3x10 3x10       Upright bike for ROM 10' seat  9 L3 10' seat  9 L3 10' seat  9 L3 10' seat  9 L3 10' seat  10 L3 10' seat 10 L3 10' seat 10 L3  10' seat 10 L3    Ther Activity             Fwd step downs   2x10 6\" 2x10 6\" 3x10 6\" 3x10 6\"   3x10 6\"    Lat step downs             Sit to stands c airex 2x15 2x15 NV 2x15 NV 2x15 NV  3x15    Reverse lunges at bar 1x10 ea      1x10-12 ea  12 ea    Rebounder 2-hand toss       X20 pink    X20 pink X20 pink    Gait Training                                       Modalities                                                "

## 2024-05-31 ENCOUNTER — OFFICE VISIT (OUTPATIENT)
Dept: PHYSICAL THERAPY | Facility: CLINIC | Age: 84
End: 2024-05-31
Payer: MEDICARE

## 2024-05-31 DIAGNOSIS — Z96.652 S/P TOTAL KNEE ARTHROPLASTY, LEFT: Primary | ICD-10-CM

## 2024-05-31 PROCEDURE — 97530 THERAPEUTIC ACTIVITIES: CPT

## 2024-05-31 PROCEDURE — 97112 NEUROMUSCULAR REEDUCATION: CPT

## 2024-05-31 PROCEDURE — 97110 THERAPEUTIC EXERCISES: CPT

## 2024-05-31 NOTE — PROGRESS NOTES
"Daily Note     Today's date: 2024  Patient name: Rian Galeana  : 1940  MRN: [id number]  Referring provider: Lynn Bhatt MD  Dx:   Encounter Diagnosis     ICD-10-CM    1. S/P total knee arthroplasty, left  Z96.652           Start Time: 0845  Stop Time: 09  Total time in clinic (min): 45 minutes    Subjective: Pt reports he feels good.      Objective: See treatment diary below      Assessment: Tolerated treatment well. Patient demonstrated fatigue post treatment and exhibited good technique with therapeutic exercises. Pt is agreeable and appropriate for discharge at this time due to achieving his functional goals with physical therapy. Pt doing very well and is showing improved single leg balance and stability. Pt to discharge and continue with home exercise program.      Plan:  DC to [facility]P     Precautions: R TKA ; L TKA 3/18    POC expires MC 30 Days PT/OT + Visit Limit?    BOMN            Visit/Unit Tracking  AUTH Status:  Date             10 visits to re-eval Used 1 2 3             Remaining                        Manuals 5/3 5/6 5/8 5/10 5/17 5/20 5/22 5/24 5/29 5/31   L knee extension stretch 10x10\" 10x10\" 10x10\" 10x10\" 10x10\" 10x10\" 10x10\" 10x10\" 10x10\" 10x10\"   Patella mobs L knee  2' 2' 2'  2'       Hamstring stretching             IASTM L lateral hamstring tendon             Neuro Re-Ed             Pt education        3' 12' 4' 4'   SLR 3x12 2x15 3x12 3x12 3x15 3x15 3x15 3x15 1# 3x15 1#    Bridges  2x10  2x10  2x10 2x10      RDLs   2x10 15# 2x10 15#   2x10 15#  2x10 25#    S/L hip abd X10; x12 2x10 3x10 3x10 2x15 2x15 2x15      Standing TKE 1x20  5\" grn NV 1x20  5\" grn 1x20  5\" grn 1x20  5\" grn 1x20 5\" grn  1x12 5\" blue 1x15 5\" blue 1x15 5\" blue   BOSU step ups       1x10 ea 2x10 ea 2x10 ea 2x10 ea   BOSU upside down stance        10x5-10\" 20\"x2 75\"x1   Ther Ex             SL leg press 2x10  65#, 3x10 75# Seat 9 2x20 75#; 2x10 85# seat 9 1x20 75#; 1x20 85# seat " "9 1x20 75#; 1x20 85# seat 9 2x20 75#; 1x20 85# seat 9 2x20 85#; 1x15 95# seat 8 1x20 85#; 2x15 95# seat 8 1x20 85#; 2x12 95# seat 8 1x20 85#; 2x12-15 95# seat 8 1x20 85#; 2x20 95# seat 8   Hamstring stretch on step  NV           Knee flex stretch on step             LAQs 2x10 2x10  2x10  2x10       Prone quad stretch 5x20\" 5x20\" 5x20\" 5x20\" 5x20\" 5x20\" 5x20\" 5x20\" 5x20\" 5x20\"   Prone hip ext X10; x12 2x12 2x10 2x10  3x10 3x10       Upright bike for ROM 10' seat  9 L3 10' seat  9 L3 10' seat  9 L3 10' seat  9 L3 10' seat  10 L3 10' seat 10 L3 10' seat 10 L3  10' seat 10 L3 10' seat 10 L3   Ther Activity             Fwd step downs   2x10 6\" 2x10 6\" 3x10 6\" 3x10 6\"   3x10 6\"    Lat step downs             Sit to stands c airex 2x15 2x15 NV 2x15 NV 2x15 NV  3x15    Reverse lunges at bar 1x10 ea      1x10-12 ea  12 ea    Rebounder 2-hand toss       X20 pink    X20 pink X20 pink X20 pink   SL rebounder toss       X20 red   X20 red   Gait Training                                       Modalities                                                  "